# Patient Record
Sex: FEMALE | Race: WHITE | NOT HISPANIC OR LATINO | Employment: FULL TIME | URBAN - METROPOLITAN AREA
[De-identification: names, ages, dates, MRNs, and addresses within clinical notes are randomized per-mention and may not be internally consistent; named-entity substitution may affect disease eponyms.]

---

## 2018-09-26 ENCOUNTER — APPOINTMENT (OUTPATIENT)
Dept: WOMENS IMAGING | Facility: HOSPITAL | Age: 38
End: 2018-09-26

## 2018-09-26 PROCEDURE — 77067 SCR MAMMO BI INCL CAD: CPT | Performed by: RADIOLOGY

## 2022-05-31 RX ORDER — CHLORCYCLIZINE HYDROCHLORIDE AND PSEUDOEPHEDRINE HYDROCHLORIDE 25; 60 MG/1; MG/1
TABLET ORAL
Qty: 20 TABLET | Refills: 0 | Status: SHIPPED | OUTPATIENT
Start: 2022-05-31 | End: 2022-11-08

## 2022-11-08 ENCOUNTER — TELEMEDICINE (OUTPATIENT)
Dept: FAMILY MEDICINE CLINIC | Facility: CLINIC | Age: 42
End: 2022-11-08

## 2022-11-08 DIAGNOSIS — G43.C1 INTRACTABLE PERIODIC HEADACHE SYNDROME: Primary | ICD-10-CM

## 2022-11-08 PROCEDURE — 99213 OFFICE O/P EST LOW 20 MIN: CPT | Performed by: FAMILY MEDICINE

## 2022-11-08 PROCEDURE — 96372 THER/PROPH/DIAG INJ SC/IM: CPT | Performed by: FAMILY MEDICINE

## 2022-11-08 RX ORDER — CYCLOSPORINE 0.5 MG/ML
EMULSION OPHTHALMIC
COMMUNITY
Start: 2018-05-08

## 2022-11-08 RX ORDER — ONDANSETRON 8 MG/1
8 TABLET, ORALLY DISINTEGRATING ORAL EVERY 8 HOURS PRN
Qty: 30 TABLET | Refills: 1 | Status: SHIPPED | OUTPATIENT
Start: 2022-11-08 | End: 2023-02-27

## 2022-11-08 RX ORDER — TRIAMCINOLONE ACETONIDE 40 MG/ML
80 INJECTION, SUSPENSION INTRA-ARTICULAR; INTRAMUSCULAR ONCE
Status: COMPLETED | OUTPATIENT
Start: 2022-11-08 | End: 2022-11-08

## 2022-11-08 RX ORDER — KETOROLAC TROMETHAMINE 30 MG/ML
60 INJECTION, SOLUTION INTRAMUSCULAR; INTRAVENOUS ONCE
Status: COMPLETED | OUTPATIENT
Start: 2022-11-08 | End: 2022-11-08

## 2022-11-08 RX ADMIN — KETOROLAC TROMETHAMINE 60 MG: 30 INJECTION, SOLUTION INTRAMUSCULAR; INTRAVENOUS at 15:54

## 2022-11-08 RX ADMIN — TRIAMCINOLONE ACETONIDE 80 MG: 40 INJECTION, SUSPENSION INTRA-ARTICULAR; INTRAMUSCULAR at 15:55

## 2022-11-08 NOTE — ASSESSMENT & PLAN NOTE
Patient will come back for Kenalog and Toradol injections.  If this continues to be an issue we did discuss talking about a daily prophylactic medication.

## 2022-11-08 NOTE — PROGRESS NOTES
Patient was seen today through synchronous audio/video technology. Verbal consent was obtained. The patient was located at Her car. Dr. Oliva was located at [facility] in [place], [place]. Vitals signs were not obtained due to lack of home monitoring access.       Date: 2022   Patient Name: Lizzeth Tejada  : 1980   MRN: [id number]     Chief Complaint:    Chief Complaint   Patient presents with   • Headache     Complaining of nausea        History of Present Illness: Lizzeth Tejada is a 42 y.o. female who is here today for Headache.  She reports persistent headache over the last couple of weeks associated with nausea.  The headache will sometimes wake her at night and is present in the morning when she wakes up.  She cannot think of any specific triggers but she has episodes of these persistent intractable headaches over the last couple of years.  She is not currently on any prophylactic or abortive migraine medications.  She previously thought this may be related to sinus issues but has not had any other sinus symptoms.           Review of Systems:   Review of Systems   Constitutional: Negative for chills, fatigue and fever.   Respiratory: Negative for cough and shortness of breath.    Cardiovascular: Negative for chest pain and palpitations.   Gastrointestinal: Positive for nausea. Negative for abdominal pain, constipation, diarrhea and vomiting.   Musculoskeletal: Negative for back pain and myalgias.   Neurological: Positive for headache. Negative for dizziness.   Psychiatric/Behavioral: Negative for depressed mood. The patient is not nervous/anxious.        Past Medical History:   Past Medical History:   Diagnosis Date   • Anxiety    • Chronically dry eyes    • Headache    • Tonsillitis        Past Surgical History:   Past Surgical History:   Procedure Laterality Date   •  SECTION     • CHOLECYSTECTOMY     • TONSILLECTOMY         Family History:   Family History    Problem Relation Age of Onset   • No Known Problems Mother    • Hypertension Father    • Lung cancer Father        Social History:   Social History     Socioeconomic History   • Marital status: Unknown   Tobacco Use   • Smoking status: Never   • Smokeless tobacco: Never   Vaping Use   • Vaping Use: Never used   Substance and Sexual Activity   • Alcohol use: Yes     Comment: socially   • Drug use: Never   • Sexual activity: Not Currently     Partners: Male       Medications:     Current Outpatient Medications:   •  cycloSPORINE (Restasis) 0.05 % ophthalmic emulsion, , Disp: , Rfl:   •  ondansetron ODT (ZOFRAN-ODT) 8 MG disintegrating tablet, Place 1 tablet on the tongue Every 8 (Eight) Hours As Needed for Nausea or Vomiting., Disp: 30 tablet, Rfl: 1    Current Facility-Administered Medications:   •  ketorolac (TORADOL) injection 60 mg, 60 mg, Intramuscular, Once, Chelly Oliva DO  •  triamcinolone acetonide (KENALOG-40) injection 80 mg, 80 mg, Intramuscular, Once, Chelly Oliva DO    Allergies:   Allergies   Allergen Reactions   • Codeine Hives     Difficulty breathing          Physical Exam:  Vital Signs: There were no vitals filed for this visit.  There is no height or weight on file to calculate BMI.     Physical Exam  Vitals and nursing note reviewed.   Constitutional:       Appearance: Normal appearance.   HENT:      Head: Normocephalic.   Pulmonary:      Effort: Pulmonary effort is normal.   Neurological:      Mental Status: She is alert and oriented to person, place, and time.   Psychiatric:         Mood and Affect: Mood normal.         Behavior: Behavior normal.           Assessment/Plan:   Diagnoses and all orders for this visit:    1. Intractable periodic headache syndrome (Primary)  Assessment & Plan:  Patient will come back for Kenalog and Toradol injections.  If this continues to be an issue we did discuss talking about a daily prophylactic medication.      Orders:  -      ondansetron ODT (ZOFRAN-ODT) 8 MG disintegrating tablet; Place 1 tablet on the tongue Every 8 (Eight) Hours As Needed for Nausea or Vomiting.  Dispense: 30 tablet; Refill: 1  -     triamcinolone acetonide (KENALOG-40) injection 80 mg  -     ketorolac (TORADOL) injection 60 mg         Follow Up:   No follow-ups on file.    Chelly Oliva DO  [facility]

## 2022-12-15 ENCOUNTER — TELEMEDICINE (OUTPATIENT)
Dept: FAMILY MEDICINE CLINIC | Facility: CLINIC | Age: 42
End: 2022-12-15

## 2022-12-15 VITALS — BODY MASS INDEX: 36.57 KG/M2 | HEIGHT: 72 IN | WEIGHT: 270 LBS

## 2022-12-15 DIAGNOSIS — J01.00 ACUTE NON-RECURRENT MAXILLARY SINUSITIS: Primary | ICD-10-CM

## 2022-12-15 PROCEDURE — 99213 OFFICE O/P EST LOW 20 MIN: CPT | Performed by: PHYSICIAN ASSISTANT

## 2022-12-15 RX ORDER — IVERMECTIN 10 MG/G
CREAM TOPICAL
COMMUNITY
Start: 2022-11-22 | End: 2023-02-27

## 2022-12-15 RX ORDER — CHLORCYCLIZINE HYDROCHLORIDE AND PSEUDOEPHEDRINE HYDROCHLORIDE 25; 60 MG/1; MG/1
TABLET ORAL
Qty: 20 TABLET | OUTPATIENT
Start: 2022-12-15

## 2022-12-15 RX ORDER — DOXYCYCLINE 100 MG/1
100 CAPSULE ORAL 2 TIMES DAILY
Qty: 20 CAPSULE | Refills: 0 | Status: SHIPPED | OUTPATIENT
Start: 2022-12-15 | End: 2023-02-27

## 2022-12-15 RX ORDER — METHYLPREDNISOLONE 4 MG/1
TABLET ORAL
Qty: 21 TABLET | Refills: 0 | Status: SHIPPED | OUTPATIENT
Start: 2022-12-15 | End: 2023-02-27

## 2022-12-15 RX ORDER — CHLORCYCLIZINE HYDROCHLORIDE AND PSEUDOEPHEDRINE HYDROCHLORIDE 25; 60 MG/1; MG/1
1 TABLET ORAL 2 TIMES DAILY
Qty: 30 TABLET | Refills: 0 | Status: SHIPPED | OUTPATIENT
Start: 2022-12-15 | End: 2022-12-19

## 2022-12-15 NOTE — PROGRESS NOTES
"Chief Complaint  Sinusitis (X5 days. Patient has been vaccinated.)    Subjective  Patient presents during the COVID-19  pandemic/federally declared state of public health emergency.  For today's visit this provider is located at [facility]. Patient was seen today through synchronous audio/video technology using [employer]. Verbal consent was obtained. The patient was located at home. Vitals signs were not obtained due to lack of home monitoring access. Time spent with patient was 15 minutes.        Lizzeth Tejada presents to [facility]  History of Present Illness patient is being seen today for \"sinusitis.\"  She has classic symptoms that include facial pressure on the right side, right-sided teeth pain and ear fullness and pressure.  She has not had fever or chills no loss of taste or smell she does not feel fatigued and has no cough or lung congestion.  She has a history of sinus infections similar to this in the past.  She has not been exposed to COVID and did do a home COVID test which was negative.    Objective   Vital Signs:   Ht 182.9 cm (72\")   Wt 122 kg (270 lb) Comment: Pt reported vitals  BMI 36.62 kg/m²     Body mass index is 36.62 kg/m².    Review of Systems   Constitutional: Negative for chills, fatigue and fever.   HENT: Positive for congestion, ear pain, postnasal drip and sinus pressure. Negative for sore throat, swollen glands and trouble swallowing.    Respiratory: Negative for cough and shortness of breath.    Cardiovascular: Negative for chest pain and palpitations.   Musculoskeletal: Negative for back pain and myalgias.   Neurological: Negative for dizziness and headache.   Psychiatric/Behavioral: Negative for depressed mood. The patient is not nervous/anxious.        Past History:  Medical History: has a past medical history of Anxiety, Chronically dry eyes, Headache, and Tonsillitis.   Surgical History: has a past surgical history " that includes Tonsillectomy (); Cholecystectomy; and  section.   Family History: family history includes Hypertension in her father; Lung cancer in her father; No Known Problems in her mother.   Social History: reports that she has never smoked. She has never used smokeless tobacco. She reports current alcohol use. She reports that she does not use drugs.      Current Outpatient Medications:   •  cycloSPORINE (RESTASIS) 0.05 % ophthalmic emulsion, , Disp: , Rfl:   •  ondansetron ODT (ZOFRAN-ODT) 8 MG disintegrating tablet, Place 1 tablet on the tongue Every 8 (Eight) Hours As Needed for Nausea or Vomiting., Disp: 30 tablet, Rfl: 1  •  Soolantra 1 % cream, , Disp: , Rfl:   •  Chlorcyclizine-Pseudoephed (Stahist AD) 25-60 MG tablet, Take 1 tablet by mouth 2 (Two) Times a Day., Disp: 30 tablet, Rfl: 0  •  doxycycline (MONODOX) 100 MG capsule, Take 1 capsule by mouth 2 (Two) Times a Day., Disp: 20 capsule, Rfl: 0  •  methylPREDNISolone (MEDROL) 4 MG dose pack, Take as directed on package instructions., Disp: 21 tablet, Rfl: 0    Allergies: Codeine    Physical Exam  Constitutional:       Appearance: Normal appearance.   Neurological:      Mental Status: She is alert and oriented to person, place, and time.   Psychiatric:         Mood and Affect: Mood normal.         Behavior: Behavior normal.          Result Review :                   Assessment and Plan    Diagnoses and all orders for this visit:    1. Acute non-recurrent maxillary sinusitis (Primary)  Assessment & Plan:  Rx for doxycycline, Stahist AD and Medrol Dosepak.  Patient was advised to call or return if symptoms persist or worsen.      Other orders  -     doxycycline (MONODOX) 100 MG capsule; Take 1 capsule by mouth 2 (Two) Times a Day.  Dispense: 20 capsule; Refill: 0  -     methylPREDNISolone (MEDROL) 4 MG dose pack; Take as directed on package instructions.  Dispense: 21 tablet; Refill: 0  -     Chlorcyclizine-Pseudoephed (Stahist AD) 25-60 MG  tablet; Take 1 tablet by mouth 2 (Two) Times a Day.  Dispense: 30 tablet; Refill: 0      Follow Up   No follow-ups on file.  Patient was given instructions and counseling regarding her condition or for health maintenance advice. Please see specific information pulled into the AVS if appropriate.     Shavon Mitchell PA-C

## 2022-12-15 NOTE — ASSESSMENT & PLAN NOTE
Rx for doxycycline, Stahist AD and Medrol Dosepak.  Patient was advised to call or return if symptoms persist or worsen.

## 2022-12-16 ENCOUNTER — TELEPHONE (OUTPATIENT)
Dept: FAMILY MEDICINE CLINIC | Facility: CLINIC | Age: 42
End: 2022-12-16

## 2022-12-16 NOTE — TELEPHONE ENCOUNTER
Caller: Lizzeth Tejada     Relationship: SELF    Best call back number: [phone] TO LEAVE DETAILED MESSAGE IF NO ANSWER    What is your medical concern? WAS SEEN VIA TELEHEALTH 12/15/22, NEW SYMPTOMS: A LOT OF TIGHTNESS IN CHEST WHEN BREATHING, SORE THROAT, IN ADDITION TO THE OTHER SYMPTOMS FROM YESTERDAY.   STAY-HIST IS ON BACK ORDER AND PHARMACY HAS NOT GOTTEN ANY IN A MONTH. DID GET DOXY-CYLCINE AND MEDROL STEROID PACK    How long has this issue been going on? ADDITIONAL SYMPTOMS BEGAN LAST NIGHT. CAN FEEL IT WHEN SHE BREATHS    Is your provider already aware of this issue? NOT ABOUT THE ADDITIONAL SYMPTOMS AND THAT THE STAY-HIST WAS NOT FILLED DUE TO BACK ORDER    PLEASE CALL WITH ADVICE OR CALL IN NEW MEDICATION TO: [facility] [id number] [facility][1] [address] - [phone] PH - [phone] FX      BOTH MOTHER AND SON DIAGNOSED WITH STREPP. ASKING IF THE CURRENT MEDICATIONS WILL COVER THE STREPP AND IF AN ALTERNATIVE FOR STAY-HIST CAN BE CALLED IN.

## 2022-12-19 RX ORDER — LORATADINE AND PSEUDOEPHEDRINE SULFATE 5; 120 MG/1; MG/1
1 TABLET, EXTENDED RELEASE ORAL 2 TIMES DAILY
Qty: 20 TABLET | Refills: 0 | Status: SHIPPED | OUTPATIENT
Start: 2022-12-19 | End: 2023-02-27

## 2022-12-19 NOTE — TELEPHONE ENCOUNTER
Patient states that she still has eye pain, ear pain, and teeth pain from the sinuses. She said that she is not experiencing chest pain but just a lot of congestion and a lot of coughing up a lot of mucus. Spoke with  and she will speak with provider and contact the patient.

## 2022-12-19 NOTE — TELEPHONE ENCOUNTER
Patient can take Claritin D instead of the Stahist and I will send in a script for that. The antibiotics should be helping the infection.   The steroids will reduce inflammation.

## 2023-01-10 ENCOUNTER — TELEPHONE (OUTPATIENT)
Dept: FAMILY MEDICINE CLINIC | Facility: CLINIC | Age: 43
End: 2023-01-10

## 2023-01-10 NOTE — TELEPHONE ENCOUNTER
Caller: Lizzeth Tejada    Relationship: Self    Best call back number: [phone]  What medication are you requesting: DIFLUCAN     What are your current symptoms: YEAST INFECTION AFTER 2 ROUNDS OF ANTIBIOTICS AND STEROIDS     How long have you been experiencing symptoms:  10 DAYS    Have you had these symptoms before:    [x] Yes  [] No    Have you been treated for these symptoms before:   [x] Yes  [] No    If a prescription is needed, what is your preferred pharmacy and phone number: [facility] [id number] [place] [id number] [facility] [address] - [phone] [facility] [phone] FX     Additional notes:  TRIED OVER THE COUNTER MEDS BUT NOT HELPING. WOULD LIKE SOMETHING CALLED IN IF POSSIBLE.

## 2023-01-11 ENCOUNTER — TELEPHONE (OUTPATIENT)
Dept: FAMILY MEDICINE CLINIC | Facility: CLINIC | Age: 43
End: 2023-01-11
Payer: COMMERCIAL

## 2023-01-11 RX ORDER — FLUCONAZOLE 150 MG/1
150 TABLET ORAL ONCE
COMMUNITY
End: 2023-01-11 | Stop reason: SDUPTHER

## 2023-01-11 RX ORDER — FLUCONAZOLE 150 MG/1
TABLET ORAL
Qty: 2 TABLET | Refills: 1 | Status: SHIPPED | OUTPATIENT
Start: 2023-01-11 | End: 2023-02-27

## 2023-01-11 NOTE — TELEPHONE ENCOUNTER
JORDANA PATIENT   yeast infection after 2 rounds of antibiotics and steroids. Patient has tried OTC meds. Onset for past 10 days. Pt is leaving to go out of town this evening and said she is totally miserable.

## 2023-02-27 ENCOUNTER — OFFICE VISIT (OUTPATIENT)
Dept: FAMILY MEDICINE CLINIC | Facility: CLINIC | Age: 43
End: 2023-02-27
Payer: COMMERCIAL

## 2023-02-27 VITALS
WEIGHT: 287.1 LBS | HEART RATE: 64 BPM | DIASTOLIC BLOOD PRESSURE: 86 MMHG | OXYGEN SATURATION: 97 % | HEIGHT: 72 IN | SYSTOLIC BLOOD PRESSURE: 112 MMHG | TEMPERATURE: 97.5 F | BODY MASS INDEX: 38.89 KG/M2

## 2023-02-27 DIAGNOSIS — N92.6 IRREGULAR MENSES: ICD-10-CM

## 2023-02-27 DIAGNOSIS — E66.09 CLASS 2 OBESITY DUE TO EXCESS CALORIES WITHOUT SERIOUS COMORBIDITY WITH BODY MASS INDEX (BMI) OF 38.0 TO 38.9 IN ADULT: ICD-10-CM

## 2023-02-27 DIAGNOSIS — Z00.00 ENCOUNTER FOR WELLNESS EXAMINATION IN ADULT: Primary | ICD-10-CM

## 2023-02-27 PROBLEM — E66.812 CLASS 2 OBESITY DUE TO EXCESS CALORIES WITHOUT SERIOUS COMORBIDITY WITH BODY MASS INDEX (BMI) OF 38.0 TO 38.9 IN ADULT: Status: ACTIVE | Noted: 2023-02-27

## 2023-02-27 PROCEDURE — 99396 PREV VISIT EST AGE 40-64: CPT | Performed by: FAMILY MEDICINE

## 2023-02-27 NOTE — ASSESSMENT & PLAN NOTE
Unclear if this is from premenopause or from having COVID.  Will obtain additional hormonal labs today.

## 2023-02-27 NOTE — PROGRESS NOTES
Patient Name: Lizzeth Tejada  : 1980   MRN: [id number]     Chief Complaint:    Chief Complaint   Patient presents with   • Annual Exam   • Tinnitus       History of Present Illness: Lizzeth Tejada is a 42 y.o. female who is here today for their annual health maintenance and physical. Patient presents for follow-up on chronic medical problems including chronic dry eyes. Patient denies adverse effects of medications, headache, dizziness, chest pain, palpitations, shortness of breath and cough. Patient complains of tinnitus, insomnia, and unexpected weight gain. Patient is here for monitoring of chronic issues and fasting lab work.  She is up-to-date on mammogram and Pap smear.  She denies family history of colon, breast or ovarian cancer.    She has been having issues with insomnia, some fatigue, and unexpected weight gain since December.  It was around this time that she had COVID and flu.  She has noticed irregular menses since that time as well and is wondering if she is starting to go through menopause.  She reports her mom went through menopause in her mid 50s.  She denies known history of SHAN but has never had a sleep study.             Review of Systems:   Review of Systems   Constitutional: Positive for fatigue and unexpected weight gain. Negative for chills and fever.   Respiratory: Negative for cough and shortness of breath.    Cardiovascular: Negative for chest pain and palpitations.   Gastrointestinal: Negative for abdominal pain, constipation, diarrhea, nausea and vomiting.   Genitourinary: Positive for menstrual problem.   Musculoskeletal: Negative for back pain and myalgias.   Neurological: Negative for dizziness and headache.   Psychiatric/Behavioral: Positive for sleep disturbance. Negative for depressed mood. The patient is not nervous/anxious.        Past Medical History, Social History, Family History and Care Team were all reviewed with patient and updated as appropriate.  "    Medications:     Current Outpatient Medications:   •  cycloSPORINE (RESTASIS) 0.05 % ophthalmic emulsion, , Disp: , Rfl:     Allergies:   Allergies   Allergen Reactions   • Codeine Hives     Difficulty breathing          Depression: PHQ-2 Depression Screening  PHQ-2 Total Score:     PHQ-9 Total Score:       Intimate partner violence: (Screen on initial visit, pregnant women, women with injuries, older adult with injury or evidence of neglect):  • Violence can be a problem in many people's lives, so I now ask every patient about trauma or abuse they may have experienced in a relationship.  • Stress/Safety - Do you feel safe in your relationship?  • Afraid/Abused - Have you ever been in a relationship where you were threatened, hurt, or afraid?  • Friend/Family - Are your friends aware you have been hurt?  • Emergency Plan - Do you have a safe place to go and the resources you need in an emergency?    Osteoporosis:   • Ost menopausal women < 65 with RF (advancing age, previous fracture, glucocorticoid therapy, parental hip fracture, low body weight, current cigarette smoking, excessive alcohol consumption, rheumatoid arthritis, secondary osteoporosis [hypogonadism/premature menopause, malabsorption, chronic liver disease, IBD]).  • All women 65 or older      Physical Exam:  Vital Signs:   Vitals:    02/27/23 1303   BP: 112/86   BP Location: Left arm   Patient Position: Sitting   Cuff Size: Large Adult   Pulse: 64   Temp: 97.5 °F (36.4 °C)   TempSrc: Temporal   SpO2: 97%   Weight: 130 kg (287 lb 1.6 oz)   Height: 182.9 cm (72\")     Body mass index is 38.94 kg/m².     Physical Exam  Vitals and nursing note reviewed.   Constitutional:       Appearance: Normal appearance. She is obese.   HENT:      Head: Normocephalic and atraumatic.      Right Ear: Ear canal normal. A middle ear effusion is present.      Left Ear: Ear canal normal. A middle ear effusion is present.      Nose: Nose normal.      Mouth/Throat:      " Mouth: Mucous membranes are moist.      Pharynx: Oropharynx is clear.   Eyes:      Conjunctiva/sclera: Conjunctivae normal.      Pupils: Pupils are equal, round, and reactive to light.   Cardiovascular:      Rate and Rhythm: Normal rate and regular rhythm.      Heart sounds: Normal heart sounds. No murmur heard.  Pulmonary:      Effort: Pulmonary effort is normal.      Breath sounds: Normal breath sounds. No wheezing, rhonchi or rales.   Abdominal:      General: Bowel sounds are normal.      Palpations: Abdomen is soft.      Tenderness: There is no abdominal tenderness.   Musculoskeletal:         General: Normal range of motion.      Cervical back: Normal range of motion and neck supple.      Right lower leg: No edema.      Left lower leg: No edema.   Lymphadenopathy:      Cervical: No cervical adenopathy.   Skin:     General: Skin is warm.      Findings: No rash.   Neurological:      General: No focal deficit present.      Mental Status: She is alert and oriented to person, place, and time.      Motor: No weakness.   Psychiatric:         Mood and Affect: Mood normal.         Behavior: Behavior normal.         Procedures      Assessment/Plan:   Diagnoses and all orders for this visit:    1. Encounter for wellness examination in adult (Primary)  Assessment & Plan:  Fasting labs today, up-to-date on health maintenance    Orders:  -     Hemoglobin A1c; Future  -     CBC Auto Differential; Future  -     Comprehensive Metabolic Panel; Future  -     Lipid Panel; Future  -     TSH; Future  -     T4, Free; Future    2. Irregular menses  Assessment & Plan:  Unclear if this is from premenopause or from having COVID.  Will obtain additional hormonal labs today.    Orders:  -     Estrogens, Total; Future  -     Progesterone; Future    3. Class 2 obesity due to excess calories without serious comorbidity with body mass index (BMI) of 38.0 to 38.9 in adult  Assessment & Plan:  Patient's (Body mass index is 38.94 kg/m².) indicates  that they are obese (BMI >30) with health conditions that include none . Weight is worsening. BMI  is above average; BMI management plan is completed. We discussed portion control and increasing exercise.            Follow Up:   Return in about 1 year (around 2/27/2024) for Annual physical.    Healthcare Maintenance:   Counseling provided on Discussed injury prevention, diet and exercise, safe sexual practices, and screening for common diseases. Encouraged use of sunscreen and seatbelts. Encouraged SBE, avoidance of tobacco, limiting alcohol, and yearly dental and eye exams.   .   Lizzeth Tejada voices understanding and acceptance of this advice and will call back with any further questions or concerns. AVS with preventive healthcare tips printed for patient.     Chelly Oliva, DO  [facility]

## 2023-02-28 LAB
ALBUMIN SERPL-MCNC: 4.3 G/DL (ref 3.8–4.8)
ALBUMIN/GLOB SERPL: 1.5 {RATIO} (ref 1.2–2.2)
ALP SERPL-CCNC: 56 IU/L (ref 44–121)
ALT SERPL-CCNC: 17 IU/L (ref 0–32)
AST SERPL-CCNC: 18 IU/L (ref 0–40)
BASOPHILS # BLD AUTO: 0.1 X10E3/UL (ref 0–0.2)
BASOPHILS NFR BLD AUTO: 1 %
BILIRUB SERPL-MCNC: 0.4 MG/DL (ref 0–1.2)
BUN SERPL-MCNC: 10 MG/DL (ref 6–24)
BUN/CREAT SERPL: 12 (ref 9–23)
CALCIUM SERPL-MCNC: 9.2 MG/DL (ref 8.7–10.2)
CHLORIDE SERPL-SCNC: 106 MMOL/L (ref 96–106)
CHOLEST SERPL-MCNC: 149 MG/DL (ref 100–199)
CO2 SERPL-SCNC: 23 MMOL/L (ref 20–29)
CREAT SERPL-MCNC: 0.81 MG/DL (ref 0.57–1)
EGFRCR SERPLBLD CKD-EPI 2021: 92 ML/MIN/1.73
EOSINOPHIL # BLD AUTO: 0.1 X10E3/UL (ref 0–0.4)
EOSINOPHIL NFR BLD AUTO: 1 %
ERYTHROCYTE [DISTWIDTH] IN BLOOD BY AUTOMATED COUNT: 12.1 % (ref 11.7–15.4)
GLOBULIN SER CALC-MCNC: 2.8 G/DL (ref 1.5–4.5)
GLUCOSE SERPL-MCNC: 87 MG/DL (ref 70–99)
HBA1C MFR BLD: 5.3 % (ref 4.8–5.6)
HCT VFR BLD AUTO: 36.9 % (ref 34–46.6)
HDLC SERPL-MCNC: 66 MG/DL
HGB BLD-MCNC: 13.1 G/DL (ref 11.1–15.9)
IMM GRANULOCYTES # BLD AUTO: 0 X10E3/UL (ref 0–0.1)
IMM GRANULOCYTES NFR BLD AUTO: 0 %
LDLC SERPL CALC-MCNC: 69 MG/DL (ref 0–99)
LYMPHOCYTES # BLD AUTO: 2.2 X10E3/UL (ref 0.7–3.1)
LYMPHOCYTES NFR BLD AUTO: 28 %
MCH RBC QN AUTO: 34.6 PG (ref 26.6–33)
MCHC RBC AUTO-ENTMCNC: 35.5 G/DL (ref 31.5–35.7)
MCV RBC AUTO: 97 FL (ref 79–97)
MONOCYTES # BLD AUTO: 0.6 X10E3/UL (ref 0.1–0.9)
MONOCYTES NFR BLD AUTO: 8 %
NEUTROPHILS # BLD AUTO: 5 X10E3/UL (ref 1.4–7)
NEUTROPHILS NFR BLD AUTO: 62 %
PLATELET # BLD AUTO: 266 X10E3/UL (ref 150–450)
POTASSIUM SERPL-SCNC: 4.7 MMOL/L (ref 3.5–5.2)
PROGEST SERPL-MCNC: 2.6 NG/ML
PROT SERPL-MCNC: 7.1 G/DL (ref 6–8.5)
RBC # BLD AUTO: 3.79 X10E6/UL (ref 3.77–5.28)
SODIUM SERPL-SCNC: 140 MMOL/L (ref 134–144)
T4 FREE SERPL-MCNC: 1.06 NG/DL (ref 0.82–1.77)
TRIGL SERPL-MCNC: 70 MG/DL (ref 0–149)
TSH SERPL DL<=0.005 MIU/L-ACNC: 0.56 UIU/ML (ref 0.45–4.5)
VLDLC SERPL CALC-MCNC: 14 MG/DL (ref 5–40)
WBC # BLD AUTO: 8 X10E3/UL (ref 3.4–10.8)

## 2023-03-05 LAB — ESTROGEN SERPL-MCNC: 152 PG/ML

## 2023-11-28 ENCOUNTER — TELEMEDICINE (OUTPATIENT)
Dept: FAMILY MEDICINE CLINIC | Facility: TELEHEALTH | Age: 43
End: 2023-11-28
Payer: COMMERCIAL

## 2023-11-28 VITALS — HEIGHT: 72 IN | WEIGHT: 287 LBS | BODY MASS INDEX: 38.87 KG/M2

## 2023-11-28 DIAGNOSIS — H66.91 RIGHT OTITIS MEDIA, UNSPECIFIED OTITIS MEDIA TYPE: Primary | ICD-10-CM

## 2023-11-28 DIAGNOSIS — J01.00 ACUTE MAXILLARY SINUSITIS, RECURRENCE NOT SPECIFIED: ICD-10-CM

## 2023-11-28 RX ORDER — PREDNISONE 20 MG/1
20 TABLET ORAL 2 TIMES DAILY
Qty: 6 TABLET | Refills: 0 | Status: SHIPPED | OUTPATIENT
Start: 2023-11-28 | End: 2023-12-01

## 2023-11-28 RX ORDER — OFLOXACIN OTIC SOLUTION 3 MG/ML
10 SOLUTION/ DROPS AURICULAR (OTIC) DAILY
Qty: 5 ML | Refills: 0 | Status: SHIPPED | OUTPATIENT
Start: 2023-11-28 | End: 2023-12-05

## 2023-11-28 RX ORDER — GUAIFENESIN 600 MG/1
600 TABLET, EXTENDED RELEASE ORAL 2 TIMES DAILY
Qty: 28 TABLET | Refills: 0 | Status: SHIPPED | OUTPATIENT
Start: 2023-11-28 | End: 2023-12-12

## 2023-11-28 NOTE — PATIENT INSTRUCTIONS
Otitis Media, Adult    Otitis media occurs when there is inflammation and fluid in the middle ear with signs and symptoms of an acute infection. The middle ear is a part of the ear that contains bones for hearing as well as air that helps send sounds to the brain. When infected fluid builds up in this space, it causes pressure and can lead to an ear infection. The eustachian tube connects the middle ear to the back of the nose (nasopharynx) and normally allows air into the middle ear. If the eustachian tube becomes blocked, fluid can build up and become infected.  What are the causes?  This condition is caused by a blockage in the eustachian tube. This can be caused by mucus or by swelling of the tube. Problems that can cause a blockage include:  A cold or other upper respiratory infection.  Allergies.  An irritant, such as tobacco smoke.  Enlarged adenoids. The adenoids are areas of soft tissue located high in the back of the throat, behind the nose and the roof of the mouth. They are part of the body's defense system (immune system).  A mass in the nasopharynx.  Damage to the ear caused by pressure changes (barotrauma).  What increases the risk?  You are more likely to develop this condition if you:  Smoke or are exposed to tobacco smoke.  Have an opening in the roof of your mouth (cleft palate).  Have gastroesophageal reflux.  Have an immune system disorder.  What are the signs or symptoms?  Symptoms of this condition include:  Ear pain.  Fever.  Decreased hearing.  Tiredness (lethargy).  Fluid leaking from the ear, if the eardrum is ruptured or has burst.  Ringing in the ear.  How is this diagnosed?    This condition is diagnosed with a physical exam. During the exam, your health care provider will use an instrument called an otoscope to look in your ear and check for redness, swelling, and fluid. He or she will also ask about your symptoms.  Your health care provider may also order tests, such as:  A pneumatic  otoscopy. This is a test to check the movement of the eardrum. It is done by squeezing a small amount of air into the ear.  A tympanogram. This is a test that shows how well the eardrum moves in response to air pressure in the ear canal. It provides a graph for your health care provider to review.  How is this treated?  This condition can go away on its own within 3-5 days. But if the condition is caused by a bacterial infection and does not go away on its own, or if it keeps coming back, your health care provider may:  Prescribe antibiotic medicine to treat the infection.  Prescribe or recommend medicines to control pain.  Follow these instructions at home:  Take over-the-counter and prescription medicines only as told by your health care provider.  If you were prescribed an antibiotic medicine, take it as told by your health care provider. Do not stop taking the antibiotic even if you start to feel better.  Keep all follow-up visits. This is important.  Contact a health care provider if:  You have bleeding from your nose.  There is a lump on your neck.  You are not feeling better in 5 days.  You feel worse instead of better.  Get help right away if:  You have severe pain that is not controlled with medicine.  You have swelling, redness, or pain around your ear.  You have stiffness in your neck.  A part of your face is not moving (paralyzed).  The bone behind your ear (mastoid bone) is tender when you touch it.  You develop a severe headache.  Summary  Otitis media is redness, soreness, and swelling of the middle ear, usually resulting in pain and decreased hearing.  This condition can go away on its own within 3-5 days.  If the problem does not go away in 3-5 days, your health care provider may give you medicines to treat the infection.  If you were prescribed an antibiotic medicine, take it as told by your health care provider.  Follow all instructions that were given to you by your health care provider.  This  information is not intended to replace advice given to you by your health care provider. Make sure you discuss any questions you have with your health care provider.  Document Revised: 03/28/2022 Document Reviewed: 03/28/2022  Elsevier Patient Education © 2023 [employer]  Sinus Infection, Adult  A sinus infection, also called sinusitis, is inflammation of your sinuses. Sinuses are hollow spaces in the bones around your face. Your sinuses are located:  Around your eyes.  In the middle of your forehead.  Behind your nose.  In your cheekbones.  Mucus normally drains out of your sinuses. When your nasal tissues become inflamed or swollen, mucus can become trapped or blocked. This allows bacteria, viruses, and fungi to grow, which leads to infection. Most infections of the sinuses are caused by a virus.  A sinus infection can develop quickly. It can last for up to 4 weeks (acute) or for more than 12 weeks (chronic). A sinus infection often develops after a cold.  What are the causes?  This condition is caused by anything that creates swelling in the sinuses or stops mucus from draining. This includes:  Allergies.  Asthma.  Infection from bacteria or viruses.  Deformities or blockages in your nose or sinuses.  Abnormal growths in the nose (nasal polyps).  Pollutants, such as chemicals or irritants in the air.  Infection from fungi. This is rare.  What increases the risk?  You are more likely to develop this condition if you:  Have a weak body defense system (immune system).  Do a lot of swimming or diving.  Overuse nasal sprays.  Smoke.  What are the signs or symptoms?  The main symptoms of this condition are pain and a feeling of pressure around the affected sinuses. Other symptoms include:  Stuffy nose or congestion that makes it difficult to breathe through your nose.  Thick yellow or greenish drainage from your nose.  Tenderness, swelling, and warmth over the affected sinuses.  A cough that may get worse at  night.  Decreased sense of smell and taste.  Extra mucus that collects in the throat or the back of the nose (postnasal drip) causing a sore throat or bad breath.  Tiredness (fatigue).  Fever.  How is this diagnosed?  This condition is diagnosed based on:  Your symptoms.  Your medical history.  A physical exam.  Tests to find out if your condition is acute or chronic. This may include:  Checking your nose for nasal polyps.  Viewing your sinuses using a device that has a light (endoscope).  Testing for allergies or bacteria.  Imaging tests, such as an MRI or CT scan.  In rare cases, a bone biopsy may be done to rule out more serious types of fungal sinus disease.  How is this treated?  Treatment for a sinus infection depends on the cause and whether your condition is chronic or acute.  If caused by a virus, your symptoms should go away on their own within 10 days. You may be given medicines to relieve symptoms. They include:  Medicines that shrink swollen nasal passages (decongestants).  A spray that eases inflammation of the nostrils (topical intranasal corticosteroids).  Rinses that help get rid of thick mucus in your nose (nasal saline washes).  Medicines that treat allergies (antihistamines).  Over-the-counter pain relievers.  If caused by bacteria, your health care provider may recommend waiting to see if your symptoms improve. Most bacterial infections will get better without antibiotic medicine. You may be given antibiotics if you have:  A severe infection.  A weak immune system.  If caused by narrow nasal passages or nasal polyps, surgery may be needed.  Follow these instructions at home:  Medicines  Take, use, or apply over-the-counter and prescription medicines only as told by your health care provider. These may include nasal sprays.  If you were prescribed an antibiotic medicine, take it as told by your health care provider. Do not stop taking the antibiotic even if you start to feel better.  Hydrate and  humidify    Drink enough fluid to keep your urine pale yellow. Staying hydrated will help to thin your mucus.  Use a cool mist humidifier to keep the humidity level in your home above 50%.  Inhale steam for 10-15 minutes, 3-4 times a day, or as told by your health care provider. You can do this in the bathroom while a hot shower is running.  Limit your exposure to cool or dry air.  Rest  Rest as much as possible.  Sleep with your head raised (elevated).  Make sure you get enough sleep each night.  General instructions    Apply a warm, moist washcloth to your face 3-4 times a day or as told by your health care provider. This will help with discomfort.  Use nasal saline washes as often as told by your health care provider.  Wash your hands often with soap and water to reduce your exposure to germs. If soap and water are not available, use hand .  Do not smoke. Avoid being around people who are smoking (secondhand smoke).  Keep all follow-up visits. This is important.  Contact a health care provider if:  You have a fever.  Your symptoms get worse.  Your symptoms do not improve within 10 days.  Get help right away if:  You have a severe headache.  You have persistent vomiting.  You have severe pain or swelling around your face or eyes.  You have vision problems.  You develop confusion.  Your neck is stiff.  You have trouble breathing.  These symptoms may be an emergency. Get help right away. Call 911.  Do not wait to see if the symptoms will go away.  Do not drive yourself to the hospital.  Summary  A sinus infection is soreness and inflammation of your sinuses. Sinuses are hollow spaces in the bones around your face.  This condition is caused by nasal tissues that become inflamed or swollen. The swelling traps or blocks the flow of mucus. This allows bacteria, viruses, and fungi to grow, which leads to infection.  If you were prescribed an antibiotic medicine, take it as told by your health care provider. Do  not stop taking the antibiotic even if you start to feel better.  Keep all follow-up visits. This is important.  This information is not intended to replace advice given to you by your health care provider. Make sure you discuss any questions you have with your health care provider.  Document Revised: 11/22/2022 Document Reviewed: 11/22/2022  Elsevier Patient Education © 2023 [employer] Inc[employer]

## 2023-11-28 NOTE — PROGRESS NOTES
"You have chosen to receive care through a telehealth visit.  Do you consent to use a video/audio connection for your medical care today? Yes     HPI  Lizzeth Tejada is a 43 y.o. female  presents with complaint of sore throat, ear pain, headache. She reports right ear pain and right maxillary sinus pain and pressure. Her nasal congestion is clear. Additional symptoms that she is having are noted in the ROS portion of this visit. She is having a colonoscopy next Friday and was told to stop everything on Monday. Her right ear is bothering her the most. She did have a COVID test and the result of that test was negative.    Review of Systems   Constitutional:  Positive for fatigue (mild). Negative for chills and fever.   HENT:  Positive for congestion (clear), ear pain, postnasal drip, rhinorrhea (mild), sinus pressure (right maxillary), sinus pain (right maxillary), sneezing (mild) and sore throat.    Respiratory:  Positive for cough (mild). Negative for chest tightness, shortness of breath and wheezing.    Cardiovascular:  Chest pain: right.   Gastrointestinal:  Negative for diarrhea and nausea.   Musculoskeletal:  Positive for myalgias (mild).   Neurological:  Positive for headaches.       Past Medical History:   Diagnosis Date    Anxiety     Chronically dry eyes     Headache     Tonsillitis        Family History   Problem Relation Age of Onset    No Known Problems Mother     Hypertension Father     Lung cancer Father        Social History     Socioeconomic History    Marital status: Unknown   Tobacco Use    Smoking status: Never    Smokeless tobacco: Never   Vaping Use    Vaping Use: Never used   Substance and Sexual Activity    Alcohol use: Yes     Comment: socially    Drug use: Never    Sexual activity: Not Currently     Partners: Male       Lizzeth Tejada  reports that she has never smoked. She has never used smokeless tobacco..patient.          Ht 182.9 cm (72\")   Wt 130 kg (287 lb)   Breastfeeding No   " BMI 38.92 kg/m²     PHYSICAL EXAM  Physical Exam   Constitutional: She is oriented to person, place, and time. She appears well-developed.   HENT:   Head: Normocephalic and atraumatic.   Right Ear: There is tenderness.   Nose: Nose normal. Right sinus exhibits maxillary sinus tenderness (patient directed exam).   Eyes: Lids are normal. Right eye exhibits no discharge. Left eye exhibits no discharge. Right conjunctiva is not injected. Left conjunctiva is not injected.   Pulmonary/Chest:  No respiratory distress.  Neurological: She is alert and oriented to person, place, and time. No cranial nerve deficit.   Psychiatric: She has a normal mood and affect. Her speech is normal and behavior is normal. Judgment and thought content normal.       Results for orders placed or performed in visit on 02/27/23   Hemoglobin A1c    Specimen: Arm, Left; Blood   Result Value Ref Range    Hemoglobin A1C 5.3 4.8 - 5.6 %   CBC Auto Differential    Specimen: Arm, Left; Blood   Result Value Ref Range    WBC 8.0 3.4 - 10.8 x10E3/uL    RBC 3.79 3.77 - 5.28 x10E6/uL    Hemoglobin 13.1 11.1 - 15.9 g/dL    Hematocrit 36.9 34.0 - 46.6 %    MCV 97 79 - 97 fL    MCH 34.6 (H) 26.6 - 33.0 pg    MCHC 35.5 31.5 - 35.7 g/dL    RDW 12.1 11.7 - 15.4 %    Platelets 266 150 - 450 x10E3/uL    Neutrophil Rel % 62 Not Estab. %    Lymphocyte Rel % 28 Not Estab. %    Monocyte Rel % 8 Not Estab. %    Eosinophil Rel % 1 Not Estab. %    Basophil Rel % 1 Not Estab. %    Neutrophils Absolute 5.0 1.4 - 7.0 x10E3/uL    Lymphocytes Absolute 2.2 0.7 - 3.1 x10E3/uL    Monocytes Absolute 0.6 0.1 - 0.9 x10E3/uL    Eosinophils Absolute 0.1 0.0 - 0.4 x10E3/uL    Basophils Absolute 0.1 0.0 - 0.2 x10E3/uL    Immature Granulocyte Rel % 0 Not Estab. %    Immature Grans Absolute 0.0 0.0 - 0.1 x10E3/uL   Comprehensive Metabolic Panel    Specimen: Arm, Left; Blood   Result Value Ref Range    Glucose 87 70 - 99 mg/dL    BUN 10 6 - 24 mg/dL    Creatinine 0.81 0.57 - 1.00 mg/dL     EGFR Result 92 >59 mL/min/1.73    BUN/Creatinine Ratio 12 9 - 23    Sodium 140 134 - 144 mmol/L    Potassium 4.7 3.5 - 5.2 mmol/L    Chloride 106 96 - 106 mmol/L    Total CO2 23 20 - 29 mmol/L    Calcium 9.2 8.7 - 10.2 mg/dL    Total Protein 7.1 6.0 - 8.5 g/dL    Albumin 4.3 3.8 - 4.8 g/dL    Globulin 2.8 1.5 - 4.5 g/dL    A/G Ratio 1.5 1.2 - 2.2    Total Bilirubin 0.4 0.0 - 1.2 mg/dL    Alkaline Phosphatase 56 44 - 121 IU/L    AST (SGOT) 18 0 - 40 IU/L    ALT (SGPT) 17 0 - 32 IU/L   Lipid Panel    Specimen: Arm, Left; Blood   Result Value Ref Range    Total Cholesterol 149 100 - 199 mg/dL    Triglycerides 70 0 - 149 mg/dL    HDL Cholesterol 66 >39 mg/dL    VLDL Cholesterol Ganesh 14 5 - 40 mg/dL    LDL Chol Calc (NIH) 69 0 - 99 mg/dL   TSH    Specimen: Arm, Left; Blood   Result Value Ref Range    TSH 0.555 0.450 - 4.500 uIU/mL   T4, Free    Specimen: Arm, Left; Blood   Result Value Ref Range    Free T4 1.06 0.82 - 1.77 ng/dL   Estrogens, Total    Specimen: Arm, Left; Blood   Result Value Ref Range    Estrogen 152 pg/mL   Progesterone    Specimen: Arm, Left; Blood   Result Value Ref Range    Progesterone 2.6 ng/mL       Diagnoses and all orders for this visit:    1. Right otitis media, unspecified otitis media type (Primary)    2. Acute maxillary sinusitis, recurrence not specified    Other orders  -     guaiFENesin (Mucinex) 600 MG 12 hr tablet; Take 1 tablet by mouth 2 (Two) Times a Day for 14 days.  Dispense: 28 tablet; Refill: 0  -     predniSONE (DELTASONE) 20 MG tablet; Take 1 tablet by mouth 2 (Two) Times a Day for 3 days.  Dispense: 6 tablet; Refill: 0  -     Floxin Otic 0.3 % otic solution; Administer 10 drops to the right ear Daily for 7 days.  Dispense: 5 mL; Refill: 0    Mucinex with plenty of fluids especially water to thin secretions and help with congestion.  Take prednisone with food and not too close to bedtime as it may keep you awake at night  Do not take prednisone with nsaids such as ibuprofen,  aleve, or aspirin  May take tylenol for pain or fever  Antibiotic ear drop as directed    FOLLOW-UP  If symptoms worsen or persist follow up with PCP, Virtual Care or Urgent Care    Patient verbalizes understanding of medication dosage, comfort measures, instructions for treatment and follow-up.    Janet Spicer, APRN  11/28/2023  13:42 EST    The use of a video visit has been reviewed with the patient and verbal informed consent has been obtained. Myself and Lizzeth Tejada participated in this visit. The patient is located in [address] [place] [address].    I am located in [place], [place]. [employer] and [employer] were utilized. I spent 25 minutes in the patient's chart for this visit.

## 2023-12-06 ENCOUNTER — OFFICE VISIT (OUTPATIENT)
Dept: FAMILY MEDICINE CLINIC | Facility: CLINIC | Age: 43
End: 2023-12-06
Payer: COMMERCIAL

## 2023-12-06 VITALS
BODY MASS INDEX: 39.68 KG/M2 | DIASTOLIC BLOOD PRESSURE: 82 MMHG | HEART RATE: 73 BPM | WEIGHT: 293 LBS | HEIGHT: 72 IN | TEMPERATURE: 100 F | OXYGEN SATURATION: 100 % | SYSTOLIC BLOOD PRESSURE: 138 MMHG

## 2023-12-06 DIAGNOSIS — J01.00 ACUTE NON-RECURRENT MAXILLARY SINUSITIS: Primary | ICD-10-CM

## 2023-12-06 DIAGNOSIS — H69.91 DYSFUNCTION OF RIGHT EUSTACHIAN TUBE: ICD-10-CM

## 2023-12-06 LAB
EXPIRATION DATE: NORMAL
FLUAV AG UPPER RESP QL IA.RAPID: NOT DETECTED
FLUBV AG UPPER RESP QL IA.RAPID: NOT DETECTED
INTERNAL CONTROL: NORMAL
Lab: NORMAL
SARS-COV-2 AG UPPER RESP QL IA.RAPID: NOT DETECTED

## 2023-12-06 PROCEDURE — 87428 SARSCOV & INF VIR A&B AG IA: CPT | Performed by: PHYSICIAN ASSISTANT

## 2023-12-06 PROCEDURE — 99214 OFFICE O/P EST MOD 30 MIN: CPT | Performed by: PHYSICIAN ASSISTANT

## 2023-12-06 PROCEDURE — 1159F MED LIST DOCD IN RCRD: CPT | Performed by: PHYSICIAN ASSISTANT

## 2023-12-06 PROCEDURE — 1160F RVW MEDS BY RX/DR IN RCRD: CPT | Performed by: PHYSICIAN ASSISTANT

## 2023-12-06 RX ORDER — PREDNISONE 20 MG/1
TABLET ORAL
Qty: 20 TABLET | Refills: 0 | Status: SHIPPED | OUTPATIENT
Start: 2023-12-06 | End: 2023-12-17

## 2023-12-06 RX ORDER — AMOXICILLIN 500 MG/1
CAPSULE ORAL
COMMUNITY
Start: 2023-11-30

## 2023-12-06 RX ORDER — DOXYCYCLINE 100 MG/1
100 CAPSULE ORAL 2 TIMES DAILY
Qty: 20 CAPSULE | Refills: 0 | Status: SHIPPED | OUTPATIENT
Start: 2023-12-06

## 2023-12-06 RX ORDER — CHLORCYCLIZINE HYDROCHLORIDE AND PSEUDOEPHEDRINE HYDROCHLORIDE 25; 60 MG/1; MG/1
1 TABLET ORAL 2 TIMES DAILY
Qty: 30 TABLET | Refills: 0 | Status: SHIPPED | OUTPATIENT
Start: 2023-12-06

## 2023-12-06 RX ORDER — FLUCONAZOLE 150 MG/1
TABLET ORAL
Qty: 2 TABLET | Refills: 0 | Status: SHIPPED | OUTPATIENT
Start: 2023-12-06

## 2023-12-06 RX ORDER — IPRATROPIUM BROMIDE 42 UG/1
SPRAY, METERED NASAL
COMMUNITY
Start: 2023-11-30

## 2023-12-06 NOTE — ASSESSMENT & PLAN NOTE
Recommended continue Flonase and hopefully the Stahist will help this, if sxs persist I recommended she see ENT for further evaluation.

## 2023-12-06 NOTE — PROGRESS NOTES
"Chief Complaint  Cough (X3 wks. Pt has been vaccinated for COVID) and Earache (Right Ear x3 wks)    Subjective          Lizzeth Tejada presents to [facility]  Cough  Associated symptoms include ear pain, a fever and a sore throat. Pertinent negatives include no chills or postnasal drip.   Earache   Associated symptoms include coughing and a sore throat.      patient is here today complaining of cough, congestion, right ear pain and fullness for the past 3 weeks.  She did state that early on she took a COVID test and was negative however she developed fever on Monday and wonders if she should have another COVID test done.  He says the fever has resolved but the Sinus pressure and ear pain is progressively worsening.    Objective   Vital Signs:   /82 (BP Location: Right arm)   Pulse 73   Temp 100 °F (37.8 °C)   Ht 182.9 cm (72\")   Wt 133 kg (293 lb 4.8 oz)   SpO2 100%   BMI 39.78 kg/m²     Body mass index is 39.78 kg/m².    Review of Systems   Constitutional:  Positive for fever. Negative for chills.   HENT:  Positive for ear pain, sore throat, swollen glands and trouble swallowing. Negative for postnasal drip and sinus pressure.    Respiratory:  Positive for cough.    Cardiovascular: Negative.        Past History:  Medical History: has a past medical history of Anxiety, Chronically dry eyes, Headache, and Tonsillitis.   Surgical History: has a past surgical history that includes Tonsillectomy (); Cholecystectomy; and  section.   Family History: family history includes Hypertension in her father; Lung cancer in her father; No Known Problems in her mother.   Social History: reports that she has never smoked. She has never used smokeless tobacco. She reports current alcohol use. She reports that she does not use drugs.      Current Outpatient Medications:     amoxicillin (AMOXIL) 500 MG capsule, , Disp: , Rfl:     guaiFENesin (Mucinex) 600 MG 12 hr tablet, Take 1 " tablet by mouth 2 (Two) Times a Day for 14 days., Disp: 28 tablet, Rfl: 0    ipratropium (ATROVENT) 0.06 % nasal spray, , Disp: , Rfl:     Chlorcyclizine-Pseudoephed (Stahist AD) 25-60 MG tablet, Take 1 tablet by mouth 2 (Two) Times a Day., Disp: 30 tablet, Rfl: 0    doxycycline (MONODOX) 100 MG capsule, Take 1 capsule by mouth 2 (Two) Times a Day., Disp: 20 capsule, Rfl: 0    fluconazole (Diflucan) 150 MG tablet, Take 1 now and repeat dose in 1 week, Disp: 2 tablet, Rfl: 0    predniSONE (DELTASONE) 20 MG tablet, Take 3 tablets by mouth Daily for 3 days, THEN 2 tablets Daily for 3 days, THEN 1 tablet Daily for 5 days., Disp: 20 tablet, Rfl: 0    Allergies: Codeine    Physical Exam  Vitals reviewed.   Constitutional:       Appearance: Normal appearance.   HENT:      Right Ear: Tympanic membrane, ear canal and external ear normal. A middle ear effusion is present.      Left Ear: Tympanic membrane, ear canal and external ear normal.      Nose: Congestion present.      Right Sinus: Maxillary sinus tenderness present.      Left Sinus: Maxillary sinus tenderness present.   Cardiovascular:      Rate and Rhythm: Normal rate and regular rhythm.      Heart sounds: Normal heart sounds. No murmur heard.  Pulmonary:      Effort: Pulmonary effort is normal. No respiratory distress.      Breath sounds: Normal breath sounds. No wheezing or rhonchi.   Musculoskeletal:      Cervical back: Neck supple.   Lymphadenopathy:      Cervical: No cervical adenopathy.   Neurological:      Mental Status: She is alert and oriented to person, place, and time.   Psychiatric:         Mood and Affect: Mood normal.         Behavior: Behavior normal.          Result Review :                   Assessment and Plan    Diagnoses and all orders for this visit:    1. Acute non-recurrent maxillary sinusitis (Primary)  Assessment & Plan:  Rx for Doxycycline, Steroid taper and stahist AD, Call or return if symptoms persist or worsen.     Orders:  -     POCT  SARS-CoV-2 Antigen BAO + Flu    2. Dysfunction of right eustachian tube  Assessment & Plan:  Recommended continue Flonase and hopefully the Stahist will help this, if sxs persist I recommended she see ENT for further evaluation.       Other orders  -     doxycycline (MONODOX) 100 MG capsule; Take 1 capsule by mouth 2 (Two) Times a Day.  Dispense: 20 capsule; Refill: 0  -     predniSONE (DELTASONE) 20 MG tablet; Take 3 tablets by mouth Daily for 3 days, THEN 2 tablets Daily for 3 days, THEN 1 tablet Daily for 5 days.  Dispense: 20 tablet; Refill: 0  -     Chlorcyclizine-Pseudoephed (Stahist AD) 25-60 MG tablet; Take 1 tablet by mouth 2 (Two) Times a Day.  Dispense: 30 tablet; Refill: 0  -     fluconazole (Diflucan) 150 MG tablet; Take 1 now and repeat dose in 1 week  Dispense: 2 tablet; Refill: 0        Follow Up   Return if symptoms worsen or fail to improve.  Patient was given instructions and counseling regarding her condition or for health maintenance advice. Please see specific information pulled into the AVS if appropriate.     Shavon Mitchell PA-C

## 2024-02-21 ENCOUNTER — OFFICE VISIT (OUTPATIENT)
Dept: FAMILY MEDICINE CLINIC | Facility: CLINIC | Age: 44
End: 2024-02-21
Payer: COMMERCIAL

## 2024-02-21 VITALS
WEIGHT: 290.7 LBS | HEART RATE: 72 BPM | HEIGHT: 72 IN | BODY MASS INDEX: 39.37 KG/M2 | SYSTOLIC BLOOD PRESSURE: 112 MMHG | DIASTOLIC BLOOD PRESSURE: 80 MMHG | OXYGEN SATURATION: 98 %

## 2024-02-21 DIAGNOSIS — F41.1 GAD (GENERALIZED ANXIETY DISORDER): ICD-10-CM

## 2024-02-21 DIAGNOSIS — N95.1 MENOPAUSAL SYNDROME: ICD-10-CM

## 2024-02-21 DIAGNOSIS — M62.838 TRAPEZIUS MUSCLE SPASM: ICD-10-CM

## 2024-02-21 DIAGNOSIS — Z00.00 ENCOUNTER FOR WELLNESS EXAMINATION IN ADULT: ICD-10-CM

## 2024-02-21 DIAGNOSIS — Z00.00 ENCOUNTER FOR WELLNESS EXAMINATION IN ADULT: Primary | ICD-10-CM

## 2024-02-21 DIAGNOSIS — G47.00 INSOMNIA, UNSPECIFIED TYPE: Primary | ICD-10-CM

## 2024-02-21 RX ORDER — TRAZODONE HYDROCHLORIDE 50 MG/1
50 TABLET ORAL NIGHTLY
Qty: 30 TABLET | Refills: 5 | Status: SHIPPED | OUTPATIENT
Start: 2024-02-21

## 2024-02-21 NOTE — ASSESSMENT & PLAN NOTE
Psychological condition is newly identified.  We will wait and try to treat insomnia first with trazodone.  We did discuss GeneSight testing as well  Psychological condition  will be reassessed at the next regular appointment.

## 2024-02-21 NOTE — ASSESSMENT & PLAN NOTE
Patient was shown stretching exercises and encouraged to use the muscle relaxer that she received from the ED.  She may use heat and will call if symptoms recur

## 2024-02-21 NOTE — PROGRESS NOTES
Date: 2024   Patient Name: Lizzeth Tejada  : 1980   MRN: [id number]     Chief Complaint:    Chief Complaint   Patient presents with    Insomnia       History of Present Illness: Lizzeth Tejada is a 43 y.o. female who is here today for insomnia.  She will attempt to go to sleep around 10:00 and does not fall asleep until 1 or 2 AM.  She gets up at 5 for work.  She reports that she does not feel fatigued throughout the day until around 9 PM at night however is constantly moving throughout the day.  She has no known history of sleep apnea and has never had a sleep study.  She has also been experiencing worsening daily anxiety and feels that she may need to discuss medication for this as well.    She presented to the ED secondary to numbness down her left upper extremity that was reproduced with certain movements.  She was having a lot of pain and spasm in her left neck, shoulder, and around the left clavicle.  Cardiac etiology was ruled out in the ED.  Numbness has since resolved.         Review of Systems:   Review of Systems   Constitutional:  Negative for chills, fatigue and fever.   Respiratory:  Negative for cough and shortness of breath.    Cardiovascular:  Negative for chest pain and palpitations.   Gastrointestinal:  Negative for abdominal pain, constipation, diarrhea, nausea and vomiting.   Musculoskeletal:  Positive for myalgias, neck pain and neck stiffness. Negative for back pain.   Neurological:  Positive for numbness. Negative for dizziness and headache.   Psychiatric/Behavioral:  Positive for sleep disturbance and stress. Negative for depressed mood. The patient is nervous/anxious and has insomnia.        Past Medical History:   Past Medical History:   Diagnosis Date    Anxiety     Chronically dry eyes     Headache     Tonsillitis        Past Surgical History:   Past Surgical History:   Procedure Laterality Date    ADENOIDECTOMY       SECTION      CHOLECYSTECTOMY       "FRACTURE SURGERY      TONSILLECTOMY  2006       Family History:   Family History   Problem Relation Age of Onset    No Known Problems Mother     Hypertension Father     Lung cancer Father     Anxiety disorder Father     Cancer Father     Alcohol abuse Brother        Social History:   Social History     Socioeconomic History    Marital status: Unknown   Tobacco Use    Smoking status: Never    Smokeless tobacco: Never   Vaping Use    Vaping Use: Never used   Substance and Sexual Activity    Alcohol use: Yes     Comment: Socially only 1-2 a momth    Drug use: Never    Sexual activity: Not Currently     Partners: Male     Birth control/protection: Abstinence       Medications:     Current Outpatient Medications:     traZODone (DESYREL) 50 MG tablet, Take 1 tablet by mouth Every Night., Disp: 30 tablet, Rfl: 5    Allergies:   Allergies   Allergen Reactions    Codeine Hives     Difficulty breathing          Physical Exam:  Vital Signs:   Vitals:    02/21/24 0833   BP: 112/80   BP Location: Right arm   Patient Position: Sitting   Cuff Size: Large Adult   Pulse: 72   SpO2: 98%   Weight: 132 kg (290 lb 11.2 oz)   Height: 182.9 cm (72\")     Body mass index is 39.43 kg/m².     Physical Exam  Vitals and nursing note reviewed.   Constitutional:       Appearance: Normal appearance. She is obese.   HENT:      Head: Normocephalic and atraumatic.   Cardiovascular:      Rate and Rhythm: Normal rate and regular rhythm.      Heart sounds: Normal heart sounds. No murmur heard.  Pulmonary:      Effort: Pulmonary effort is normal.      Breath sounds: Normal breath sounds. No wheezing.   Musculoskeletal:      Comments: Significant spasm and hypertonicity of bilateral trapezius at the neck and shoulders.  Very tight pectoralis muscles bilaterally   Neurological:      Mental Status: She is alert and oriented to person, place, and time. Mental status is at baseline.   Psychiatric:         Mood and Affect: Mood normal.         Behavior: " Behavior normal.           Assessment/Plan:   Diagnoses and all orders for this visit:    1. Insomnia, unspecified type (Primary)  Assessment & Plan:  Rx trazodone 50 mg at bedtime.  We did discuss the potential need for sleep study should she not respond to pharmacologic therapy.  Side effects discussed.    Orders:  -     traZODone (DESYREL) 50 MG tablet; Take 1 tablet by mouth Every Night.  Dispense: 30 tablet; Refill: 5    2. NOAM (generalized anxiety disorder)  Assessment & Plan:  Psychological condition is newly identified.  We will wait and try to treat insomnia first with trazodone.  We did discuss GeneSight testing as well  Psychological condition  will be reassessed at the next regular appointment.      3. Trapezius muscle spasm  Assessment & Plan:  Patient was shown stretching exercises and encouraged to use the muscle relaxer that she received from the ED.  She may use heat and will call if symptoms recur             Follow Up:   Return in about 6 weeks (around 4/3/2024) for Med Recheck.    Chelly Oliva, DO  [facility]

## 2024-02-21 NOTE — ASSESSMENT & PLAN NOTE
Rx trazodone 50 mg at bedtime.  We did discuss the potential need for sleep study should she not respond to pharmacologic therapy.  Side effects discussed.

## 2024-02-22 LAB
ALBUMIN SERPL-MCNC: 4.5 G/DL (ref 3.9–4.9)
ALBUMIN/GLOB SERPL: 1.7 {RATIO} (ref 1.2–2.2)
ALP SERPL-CCNC: 66 IU/L (ref 44–121)
ALT SERPL-CCNC: 13 IU/L (ref 0–32)
AST SERPL-CCNC: 15 IU/L (ref 0–40)
BASOPHILS # BLD AUTO: 0.1 X10E3/UL (ref 0–0.2)
BASOPHILS NFR BLD AUTO: 1 %
BILIRUB SERPL-MCNC: 0.5 MG/DL (ref 0–1.2)
BUN SERPL-MCNC: 16 MG/DL (ref 6–24)
BUN/CREAT SERPL: 18 (ref 9–23)
CALCIUM SERPL-MCNC: 9.8 MG/DL (ref 8.7–10.2)
CHLORIDE SERPL-SCNC: 105 MMOL/L (ref 96–106)
CHOLEST SERPL-MCNC: 152 MG/DL (ref 100–199)
CO2 SERPL-SCNC: 21 MMOL/L (ref 20–29)
CREAT SERPL-MCNC: 0.91 MG/DL (ref 0.57–1)
EGFRCR SERPLBLD CKD-EPI 2021: 79 ML/MIN/1.73
EOSINOPHIL # BLD AUTO: 0.1 X10E3/UL (ref 0–0.4)
EOSINOPHIL NFR BLD AUTO: 1 %
ERYTHROCYTE [DISTWIDTH] IN BLOOD BY AUTOMATED COUNT: 12.6 % (ref 11.7–15.4)
GLOBULIN SER CALC-MCNC: 2.7 G/DL (ref 1.5–4.5)
GLUCOSE SERPL-MCNC: 90 MG/DL (ref 70–99)
HBA1C MFR BLD: 5.1 % (ref 4.8–5.6)
HCT VFR BLD AUTO: 43 % (ref 34–46.6)
HDLC SERPL-MCNC: 66 MG/DL
HGB BLD-MCNC: 14.1 G/DL (ref 11.1–15.9)
IMM GRANULOCYTES # BLD AUTO: 0 X10E3/UL (ref 0–0.1)
IMM GRANULOCYTES NFR BLD AUTO: 0 %
LDLC SERPL CALC-MCNC: 72 MG/DL (ref 0–99)
LYMPHOCYTES # BLD AUTO: 1.7 X10E3/UL (ref 0.7–3.1)
LYMPHOCYTES NFR BLD AUTO: 27 %
MCH RBC QN AUTO: 32.8 PG (ref 26.6–33)
MCHC RBC AUTO-ENTMCNC: 32.8 G/DL (ref 31.5–35.7)
MCV RBC AUTO: 100 FL (ref 79–97)
MONOCYTES # BLD AUTO: 0.6 X10E3/UL (ref 0.1–0.9)
MONOCYTES NFR BLD AUTO: 9 %
NEUTROPHILS # BLD AUTO: 3.9 X10E3/UL (ref 1.4–7)
NEUTROPHILS NFR BLD AUTO: 62 %
PLATELET # BLD AUTO: 248 X10E3/UL (ref 150–450)
POTASSIUM SERPL-SCNC: 4.8 MMOL/L (ref 3.5–5.2)
PROGEST SERPL-MCNC: 8.7 NG/ML
PROT SERPL-MCNC: 7.2 G/DL (ref 6–8.5)
RBC # BLD AUTO: 4.3 X10E6/UL (ref 3.77–5.28)
SODIUM SERPL-SCNC: 139 MMOL/L (ref 134–144)
T4 FREE SERPL-MCNC: 1.19 NG/DL (ref 0.82–1.77)
TRIGL SERPL-MCNC: 75 MG/DL (ref 0–149)
TSH SERPL DL<=0.005 MIU/L-ACNC: 0.87 UIU/ML (ref 0.45–4.5)
VLDLC SERPL CALC-MCNC: 14 MG/DL (ref 5–40)
WBC # BLD AUTO: 6.3 X10E3/UL (ref 3.4–10.8)

## 2024-02-27 LAB
TESTOST FREE SERPL-MCNC: 0.6 PG/ML (ref 0–4.2)
TESTOST SERPL-MCNC: 8 NG/DL (ref 4–50)

## 2024-02-27 RX ORDER — METHOCARBAMOL 750 MG/1
TABLET, FILM COATED ORAL
Qty: 60 TABLET | Refills: 2 | Status: SHIPPED | OUTPATIENT
Start: 2024-02-27

## 2024-02-28 LAB — ESTROGEN SERPL-MCNC: 168 PG/ML

## 2024-03-08 ENCOUNTER — OFFICE VISIT (OUTPATIENT)
Dept: FAMILY MEDICINE CLINIC | Facility: CLINIC | Age: 44
End: 2024-03-08
Payer: COMMERCIAL

## 2024-03-08 VITALS
DIASTOLIC BLOOD PRESSURE: 66 MMHG | OXYGEN SATURATION: 97 % | HEART RATE: 67 BPM | HEIGHT: 72 IN | BODY MASS INDEX: 39.68 KG/M2 | WEIGHT: 293 LBS | SYSTOLIC BLOOD PRESSURE: 100 MMHG

## 2024-03-08 DIAGNOSIS — J02.9 SORE THROAT: ICD-10-CM

## 2024-03-08 DIAGNOSIS — J00 ACUTE NASOPHARYNGITIS: Primary | ICD-10-CM

## 2024-03-08 LAB
EXPIRATION DATE: NORMAL
EXPIRATION DATE: NORMAL
FLUAV AG UPPER RESP QL IA.RAPID: NOT DETECTED
FLUBV AG UPPER RESP QL IA.RAPID: NOT DETECTED
INTERNAL CONTROL: NORMAL
INTERNAL CONTROL: NORMAL
Lab: NORMAL
Lab: NORMAL
S PYO AG THROAT QL: NEGATIVE
SARS-COV-2 AG UPPER RESP QL IA.RAPID: NOT DETECTED

## 2024-03-08 PROCEDURE — 1160F RVW MEDS BY RX/DR IN RCRD: CPT | Performed by: PHYSICIAN ASSISTANT

## 2024-03-08 PROCEDURE — 87428 SARSCOV & INF VIR A&B AG IA: CPT | Performed by: PHYSICIAN ASSISTANT

## 2024-03-08 PROCEDURE — 1159F MED LIST DOCD IN RCRD: CPT | Performed by: PHYSICIAN ASSISTANT

## 2024-03-08 PROCEDURE — 99213 OFFICE O/P EST LOW 20 MIN: CPT | Performed by: PHYSICIAN ASSISTANT

## 2024-03-08 PROCEDURE — 87880 STREP A ASSAY W/OPTIC: CPT | Performed by: PHYSICIAN ASSISTANT

## 2024-03-12 PROBLEM — U07.1 UPPER RESPIRATORY TRACT INFECTION DUE TO COVID-19 VIRUS: Status: ACTIVE | Noted: 2024-03-12

## 2024-03-12 PROBLEM — J06.9 UPPER RESPIRATORY TRACT INFECTION DUE TO COVID-19 VIRUS: Status: RESOLVED | Noted: 2024-03-12 | Resolved: 2024-03-12

## 2024-03-12 PROBLEM — U07.1 UPPER RESPIRATORY TRACT INFECTION DUE TO COVID-19 VIRUS: Status: RESOLVED | Noted: 2024-03-12 | Resolved: 2024-03-12

## 2024-03-12 PROBLEM — J00 ACUTE NASOPHARYNGITIS: Status: ACTIVE | Noted: 2024-03-12

## 2024-03-12 PROBLEM — J06.9 UPPER RESPIRATORY TRACT INFECTION DUE TO COVID-19 VIRUS: Status: ACTIVE | Noted: 2024-03-12

## 2024-03-12 NOTE — PROGRESS NOTES
"Chief Complaint  Earache, Sore Throat, and Headache    Subjective        Lizzeth Tejada presents to [facility]  History of Present Illness  Patient reports today secondary to waking with sore throat, headache and earache.  Patient denies any fever or chills.  Patient denies cough.  Patient denies any shortness of breath or difficulty breathing.  Earache   Associated symptoms include headaches and a sore throat.   Sore Throat   Associated symptoms include ear pain and headaches.   Headache      Objective   Vital Signs:  /66   Pulse 67   Ht 182.9 cm (72\")   Wt 133 kg (293 lb)   SpO2 97%   BMI 39.74 kg/m²   Estimated body mass index is 39.74 kg/m² as calculated from the following:    Height as of this encounter: 182.9 cm (72\").    Weight as of this encounter: 133 kg (293 lb).             Physical Exam  Vitals and nursing note reviewed.   Constitutional:       General: She is not in acute distress.     Appearance: Normal appearance.   HENT:      Head: Normocephalic.      Right Ear: Hearing, tympanic membrane and ear canal normal.      Left Ear: Hearing, tympanic membrane and ear canal normal.      Mouth/Throat:      Pharynx: No oropharyngeal exudate or posterior oropharyngeal erythema.   Eyes:      Pupils: Pupils are equal, round, and reactive to light.   Cardiovascular:      Rate and Rhythm: Normal rate and regular rhythm.   Pulmonary:      Effort: Pulmonary effort is normal. No respiratory distress.      Breath sounds: No wheezing.   Skin:     General: Skin is warm and dry.   Neurological:      Mental Status: She is alert.   Psychiatric:         Mood and Affect: Mood normal.        Result Review :                     Assessment and Plan     Diagnoses and all orders for this visit:    1. Acute nasopharyngitis (Primary)  Assessment & Plan:  Patient negative for COVID, strep and flu.  Given symptoms started today advised patient to take over-the-counter medication, increase " fluids and rest.  Also advised patient if she does not improve she may want to retest in 5 days      2. Sore throat  -     POCT SARS-CoV-2 Antigen BAO + Flu  -     POCT rapid strep A             Follow Up     Return if symptoms worsen or fail to improve.  Patient was given instructions and counseling regarding her condition or for health maintenance advice. Please see specific information pulled into the AVS if appropriate.

## 2024-03-12 NOTE — ASSESSMENT & PLAN NOTE
Patient negative for COVID, strep and flu.  Given symptoms started today advised patient to take over-the-counter medication, increase fluids and rest.  Also advised patient if she does not improve she may want to retest in 5 days

## 2024-03-28 ENCOUNTER — OFFICE VISIT (OUTPATIENT)
Dept: FAMILY MEDICINE CLINIC | Facility: CLINIC | Age: 44
End: 2024-03-28
Payer: COMMERCIAL

## 2024-03-28 VITALS
DIASTOLIC BLOOD PRESSURE: 68 MMHG | HEART RATE: 71 BPM | BODY MASS INDEX: 39.68 KG/M2 | OXYGEN SATURATION: 99 % | SYSTOLIC BLOOD PRESSURE: 100 MMHG | WEIGHT: 293 LBS | HEIGHT: 72 IN

## 2024-03-28 DIAGNOSIS — F41.1 GAD (GENERALIZED ANXIETY DISORDER): Primary | ICD-10-CM

## 2024-03-28 DIAGNOSIS — G47.00 INSOMNIA, UNSPECIFIED TYPE: ICD-10-CM

## 2024-03-28 DIAGNOSIS — E66.01 MORBID OBESITY: ICD-10-CM

## 2024-03-28 RX ORDER — TRAZODONE HYDROCHLORIDE 50 MG/1
50 TABLET ORAL NIGHTLY
Qty: 90 TABLET | Refills: 1 | Status: SHIPPED | OUTPATIENT
Start: 2024-03-28

## 2024-03-28 NOTE — PROGRESS NOTES
Date: 2024   Patient Name: Lizzeth Tejada  : 1980   MRN: [id number]     Chief Complaint:    Chief Complaint   Patient presents with    Insomnia       History of Present Illness: Lizzeth Tejada is a 43 y.o. female who is here today to follow up for insomnia and anxiety. She reports improvement in sleep with addition of trazodone and does feel her anxiety has improved slightly with improvement in sleep. Her family however still feels strongly about her needing medication for her anxiety and she is open to this discussion.     She is also struggling with her weight but does not desire to pursue medical or surgical weight loss at this time. She is working on diet and exercise but would like to discuss other recommendations.          Review of Systems:   Review of Systems   Constitutional:  Negative for chills, fatigue and fever.   Respiratory:  Negative for cough and shortness of breath.    Cardiovascular:  Negative for chest pain and palpitations.   Gastrointestinal:  Negative for abdominal pain, constipation, diarrhea, nausea and vomiting.   Musculoskeletal:  Negative for back pain and myalgias.   Neurological:  Negative for dizziness and headache.   Psychiatric/Behavioral:  Negative for sleep disturbance and depressed mood. The patient is nervous/anxious.        Past Medical History:   Past Medical History:   Diagnosis Date    Anxiety     Chronically dry eyes     Headache     Tonsillitis        Past Surgical History:   Past Surgical History:   Procedure Laterality Date    ADENOIDECTOMY       SECTION      CHOLECYSTECTOMY      COLONOSCOPY  2024    FRACTURE SURGERY      TONSILLECTOMY  2006       Family History:   Family History   Problem Relation Age of Onset    No Known Problems Mother     Hypertension Father     Lung cancer Father     Anxiety disorder Father     Cancer Father     Alcohol abuse Brother        Social History:   Social History     Socioeconomic History    Marital  "status: Unknown   Tobacco Use    Smoking status: Never    Smokeless tobacco: Never   Vaping Use    Vaping status: Never Used   Substance and Sexual Activity    Alcohol use: Yes     Comment: Socially only 1-2 a momth    Drug use: Never    Sexual activity: Not Currently     Partners: Male     Birth control/protection: Abstinence       Medications:     Current Outpatient Medications:     traZODone (DESYREL) 50 MG tablet, Take 1 tablet by mouth Every Night., Disp: 90 tablet, Rfl: 1    Allergies:   Allergies   Allergen Reactions    Codeine Hives     Difficulty breathing        PHQ-2 Total Score:     PHQ-9 Total Score:       Physical Exam:  Vital Signs:   Vitals:    03/28/24 1415   BP: 100/68   BP Location: Right arm   Patient Position: Sitting   Cuff Size: Large Adult   Pulse: 71   SpO2: 99%   Weight: 135 kg (297 lb 1.6 oz)   Height: 182.9 cm (72\")     Body mass index is 40.29 kg/m².     Physical Exam  Vitals and nursing note reviewed.   Constitutional:       Appearance: Normal appearance. She is obese.   HENT:      Head: Normocephalic.   Pulmonary:      Effort: Pulmonary effort is normal.   Neurological:      Mental Status: She is alert and oriented to person, place, and time.   Psychiatric:         Mood and Affect: Mood normal.         Behavior: Behavior normal.           Assessment/Plan:   Diagnoses and all orders for this visit:    1. NOAM (generalized anxiety disorder) (Primary)  Assessment & Plan:  Psychological condition is unchanged.  GeneSight testing performed in office today, will discuss treatment once this has resulted  Psychological condition  will be reassessed at the next regular appointment.      2. Insomnia, unspecified type  Assessment & Plan:  Improving with trazodone    Orders:  -     traZODone (DESYREL) 50 MG tablet; Take 1 tablet by mouth Every Night.  Dispense: 90 tablet; Refill: 1    3. Morbid obesity  Assessment & Plan:  Patient's (Body mass index is 40.29 kg/m².) indicates that they are " morbidly/severely obese (BMI > 40 or > 35 with obesity - related health condition) with health conditions that include none . Weight is worsening. BMI  is above average; BMI management plan is completed. We discussed portion control, increasing exercise, joining a fitness center or start home based exercise program, and management of depression/anxiety/stress to control compensatory eating.   We also discussed her consulting a nutritionist to help with diet changes and macros.              Follow Up:   Return in about 8 weeks (around 5/23/2024) for Med Recheck.    Chelly Oliva,   [facility]

## 2024-03-29 NOTE — ASSESSMENT & PLAN NOTE
Patient's (Body mass index is 40.29 kg/m².) indicates that they are morbidly/severely obese (BMI > 40 or > 35 with obesity - related health condition) with health conditions that include none . Weight is worsening. BMI  is above average; BMI management plan is completed. We discussed portion control, increasing exercise, joining a fitness center or start home based exercise program, and management of depression/anxiety/stress to control compensatory eating.   We also discussed her consulting a nutritionist to help with diet changes and macros.

## 2024-03-29 NOTE — ASSESSMENT & PLAN NOTE
Psychological condition is unchanged.  Alverix testing performed in office today, will discuss treatment once this has resulted  Psychological condition  will be reassessed at the next regular appointment.

## 2024-07-10 ENCOUNTER — TRANSCRIBE ORDERS (OUTPATIENT)
Dept: PHYSICAL THERAPY | Facility: CLINIC | Age: 44
End: 2024-07-10
Payer: COMMERCIAL

## 2024-07-10 ENCOUNTER — TREATMENT (OUTPATIENT)
Dept: PHYSICAL THERAPY | Facility: CLINIC | Age: 44
End: 2024-07-10
Payer: COMMERCIAL

## 2024-07-10 DIAGNOSIS — G89.29 CHRONIC PAIN OF RIGHT KNEE: Primary | ICD-10-CM

## 2024-07-10 DIAGNOSIS — M25.561 CHRONIC PAIN OF RIGHT KNEE: Primary | ICD-10-CM

## 2024-07-10 DIAGNOSIS — M76.51 PATELLAR TENDINITIS OF RIGHT KNEE: ICD-10-CM

## 2024-07-10 NOTE — PROGRESS NOTES
Physical Therapy Initial Evaluation and Plan of Care    [facility]  [address]  [place] [address]    Patient: Lizzeth Tejada   : 1980  Diagnosis/ICD-10 Code:  Chronic pain of right knee [M25.561, G89.29]  Referring practitioner: Mando PRICE MD  Date of Initial Visit: 7/10/2024  Today's Date: 7/10/2024  Patient seen for 1 sessions           Subjective Questionnaire: LEFS: 61/80      Subjective Evaluation    History of Present Illness  Date of onset: 2024  Mechanism of injury: R knee pain and instability. Patient reports having chronic instability and intermittent but she aggravated her knee when she was knocked off her balance and landed on her knee while walking down stairs. She started getting daily symptoms after this even though she still does all activities without restriction. J brace does not improve symptoms. Her surgeon discussed MPFR and tibial tubercle osteotomy as options but wants PT to eccentrically load the patellar tendon. MRI revealed significant R petallofemoral chondral loss and mild medial compartment OA.    CLOF: limps after 1 mile and pain walking after 2 miles      Patient Occupation: rep for orthopedic [employer]  Current pain ratin  At best pain ratin  At worst pain ratin  Exacerbated by: kneeling, hiking, after prolonged sitting.    Patient Goals  Patient goals for therapy: decreased pain, increased strength, return to sport/leisure activities and independence with ADLs/IADLs           Objective          Tenderness     Right Knee   Tenderness in the inferior fat pad, lateral patella and patellar tendon.     Active Range of Motion   Left Knee   Flexion: 144 degrees   Extension: Left knee active extension: lacking 1.     Right Knee   Flexion: 130 degrees   Extension: Right knee active extension: lacking 5, pain.     Passive Range of Motion     Right Knee   Flexion: with pain  Extension: with pain    Patellar Mobility    Left Knee Patellar tendons within functional limits include the medial, lateral, superior and inferior.     Right Knee Patellar tendons within functional limits include the medial, superior and inferior. Hypermobile in the lateral patellar tendon(s).     Strength/Myotome Testing     Left Hip   Planes of Motion   Abduction: 4+  External rotation: 5    Right Hip   Planes of Motion   Abduction: 4-  External rotation: 4+    Left Knee   Flexion: 5  Extension: 4- (pain)  Quadriceps contraction: good    Right Knee   Flexion: 5  Extension: 3+ (pain and crepitus)  Quadriceps contraction: fair    Tests     Right Knee   Negative valgus stress test at 0 degrees, valgus stress test at 30 degrees, varus stress test at 0 degrees and varus stress test at 30 degrees.           Assessment & Plan       Assessment  Impairments: abnormal gait, abnormal muscle firing, abnormal or restricted ROM, activity intolerance, impaired balance, impaired physical strength, lacks appropriate home exercise program, pain with function, safety issue and weight-bearing intolerance   Functional limitations: carrying objects, walking, uncomfortable because of pain, standing and stooping   Assessment details: Patient is a 44-year-old female who presents with recurrent right anterior knee pain, which recurred when she was thrown off balance descending stairs and felt the front part of her right knee strain.  MRI revealed severe right patellofemoral OA, mild to moderate tibiofemoral OA, and patellar tendinosis with partial tearing at the tendo-osseous junction with the tibia.  Palpation of right patellar tendon reproduces most of her symptoms, especially at the distal aspect.  Patient also lacks right TKE which will need to be normalized to create optimal mechanics at the knee.  Trialed cross friction to right patellar tendon, with patient advised to complete open chain quadriceps eccentric loading 3X/week at her gym.  Barriers to therapy: Chronicity,  comorbidities  Prognosis: good    Goals  Plan Goals: Short term goals (4 weeks)  1. Patient to be compliant with initial HEP.  2. Patient to demonstrate symmetrical knee PROM.  3. Patient to improve LEFS to greater than or equal to 65/80.    Long Term goals (8 weeks)  1. Patient to demonstrate symmetrical knee AROM.  2. Patient to demonstrate pain free and normal strength with MMTs.  3. Patient to ascend and descend eight 8 inch steps reciprocally with one handrail with minimal to no antalgia or difficulty.  4. Patient to demonstrate independence with home exercise program.  5. Patient to improve LEFS to greater than or equal to 69/80.       Plan  Therapy options: will be seen for skilled therapy services  Planned modality interventions: thermotherapy (hydrocollator packs), TENS, cryotherapy and dry needling  Planned therapy interventions: manual therapy, neuromuscular re-education, soft tissue mobilization, strengthening, stretching, therapeutic activities, joint mobilization, home exercise program, functional ROM exercises, balance/weight-bearing training, abdominal trunk stabilization, postural training, motor coordination training, spinal/joint mobilization, ADL retraining, transfer training, body mechanics training and gait training  Frequency: 2x week  Duration in weeks: 8  Treatment plan discussed with: patient        Timed:  Manual Therapy:    10     mins  24296;  Therapeutic Exercise:    15     mins  87684;     Neuromuscular Katty:    0    mins  72214;    Therapeutic Activity:     13     mins  42221;     Gait Trainin     mins  81440;     Ultrasound:     0     mins  39996;    Electrical Stimulation:    0     mins  27877 ( );    Untimed:  Electrical Stimulation:    0     mins  41515 ( );  Mechanical Traction:    0     mins  20984;   Dry Needlin     mins      Timed Treatment:   38   mins   Total Treatment:     65   mins    PT SIGNATURE: Marcos Koroma PT   License Number:  [id number]  DATE TREATMENT INITIATED: 7/10/2024    Initial Certification  Certification Period: 10/8/2024  I certify that the therapy services are furnished while this patient is under my care.  The services outlined above are required by this patient, and will be reviewed every 90 days.     PHYSICIAN: Mando Mcwilliams MD      DATE:     Please sign and return via fax to [phone].. Thank you, [facility].

## 2024-07-15 ENCOUNTER — TREATMENT (OUTPATIENT)
Dept: PHYSICAL THERAPY | Facility: CLINIC | Age: 44
End: 2024-07-15
Payer: COMMERCIAL

## 2024-07-15 DIAGNOSIS — M25.561 CHRONIC PAIN OF RIGHT KNEE: Primary | ICD-10-CM

## 2024-07-15 DIAGNOSIS — G89.29 CHRONIC PAIN OF RIGHT KNEE: Primary | ICD-10-CM

## 2024-07-15 DIAGNOSIS — M76.51 PATELLAR TENDINITIS OF RIGHT KNEE: ICD-10-CM

## 2024-07-15 NOTE — PROGRESS NOTES
Physical Therapy Daily Progress Note    [facility]  [address]  [place] [address]      Patient: Lizzeth Tejada   : 1980  Diagnosis/ICD-10 Code:  Chronic pain of right knee [M25.561, G89.29]  Referring practitioner: Mando PRICE MD  Date of Initial Visit: Type: THERAPY  Noted: 7/10/2024  Today's Date: 7/15/2024  Patient seen for 2 sessions           Subjective   Patient reports her R knee felt a little better walking after last visit.     Objective   See Exercise, Manual, and Modality Logs for complete treatment.       Assessment/Plan  R knee now lacking 3 deg of TKE, better than at eval. Continued cross friction to R patellar tendon with symptom improvement noted. Modified PROM R extension stretching and progressed R quad eccentrics, today in CKC. Also introduced R Achilles eccentrics  due to hx of repair and c/o tightness in the presence of R knee flexion contracture and posterior knee tightness.            Timed:  Manual Therapy:    12     mins  11525;  Therapeutic Exercise:    18     mins  91056;     Neuromuscular Katty:   0    mins  18466;    Therapeutic Activity:     10     mins  26405;     Gait Trainin     mins  73194;     Ultrasound:     0     mins  72547;    Electrical Stimulation:    0     mins  10279 ( );    Untimed:  Electrical Stimulation:    0     mins  55535 ( );  Mechanical Traction:    0     mins  09927;   Dry Needlin     mins     Timed Treatment:   40   mins   Total Treatment:     40   mins    Marcos Koroma PT  Physical Therapist

## 2024-07-18 ENCOUNTER — TREATMENT (OUTPATIENT)
Dept: PHYSICAL THERAPY | Facility: CLINIC | Age: 44
End: 2024-07-18
Payer: COMMERCIAL

## 2024-07-18 DIAGNOSIS — M25.561 CHRONIC PAIN OF RIGHT KNEE: Primary | ICD-10-CM

## 2024-07-18 DIAGNOSIS — M76.51 PATELLAR TENDINITIS OF RIGHT KNEE: ICD-10-CM

## 2024-07-18 DIAGNOSIS — G89.29 CHRONIC PAIN OF RIGHT KNEE: Primary | ICD-10-CM

## 2024-07-18 NOTE — PROGRESS NOTES
Physical Therapy Daily Progress Note    [facility]  [address]  [place] [address]      Patient: Lizzeth Tejada   : 1980  Diagnosis/ICD-10 Code:  Chronic pain of right knee [M25.561, G89.29]  Referring practitioner: Mando PRICE MD  Date of Initial Visit: Type: THERAPY  Noted: 7/10/2024  Today's Date: 2024  Patient seen for 3 sessions           Subjective   Patient reports her R patellar tendon felt better for a day or 2 after last visit but she aggravated her R patella when she felt like it became unstable.  She notes increased peripatellar pain the rest of the night and into this morning.    Objective   See Exercise, Manual, and Modality Logs for complete treatment.       Assessment/Plan  Modified IASTM to right distal patellar tendon, with no change in symptoms noted afterward.  Added STM to right medial gastroc due to posterior knee tightness, after which mild improvements were reported.  Held most other exercises due to patient either completing an earlier today or last night and presence of mild increased right knee edema.            Timed:  Manual Therapy:    15     mins  17089;  Therapeutic Exercise:    13     mins  75261;     Neuromuscular Katty:   0    mins  57840;    Therapeutic Activity:     10     mins  66910;     Gait Trainin     mins  75813;     Ultrasound:     0     mins  97646;    Electrical Stimulation:    0     mins  46872 ( );    Untimed:  Electrical Stimulation:    0     mins  22091 ( );  Mechanical Traction:    0     mins  08817;   Dry Needlin     mins     Timed Treatment:   38   mins   Total Treatment:     38   mins    Marcos Koroma PT  Physical Therapist

## 2024-07-22 ENCOUNTER — TREATMENT (OUTPATIENT)
Dept: PHYSICAL THERAPY | Facility: CLINIC | Age: 44
End: 2024-07-22
Payer: COMMERCIAL

## 2024-07-22 DIAGNOSIS — M25.561 CHRONIC PAIN OF RIGHT KNEE: Primary | ICD-10-CM

## 2024-07-22 DIAGNOSIS — G89.29 CHRONIC PAIN OF RIGHT KNEE: Primary | ICD-10-CM

## 2024-07-22 DIAGNOSIS — M76.51 PATELLAR TENDINITIS OF RIGHT KNEE: ICD-10-CM

## 2024-07-22 PROCEDURE — 97140 MANUAL THERAPY 1/> REGIONS: CPT | Performed by: PHYSICAL THERAPIST

## 2024-07-22 PROCEDURE — 97110 THERAPEUTIC EXERCISES: CPT | Performed by: PHYSICAL THERAPIST

## 2024-07-22 PROCEDURE — 97112 NEUROMUSCULAR REEDUCATION: CPT | Performed by: PHYSICAL THERAPIST

## 2024-07-22 NOTE — PROGRESS NOTES
Physical Therapy Daily Progress Note    [facility]  [address]  [place] [address]      Patient: Lizzeth Tejada   : 1980  Diagnosis/ICD-10 Code:  Chronic pain of right knee [M25.561, G89.29]  Referring practitioner: Mando PRICE MD  Date of Initial Visit: Type: THERAPY  Noted: 7/10/2024  Today's Date: 2024  Patient seen for 4 sessions           Subjective   Patient reports significant pain relief for 1-2 days after last visit.     Objective   See Exercise, Manual, and Modality Logs for complete treatment.       Assessment/Plan  Continued IASTM to right distal patellar tendon insertion, she reported more local pain during intervention today.  Trialed multiple TKE strengthening strategies, with mild increase in knee pain and clicking when in stance.  Less pain reported during SLR.            Timed:  Manual Therapy:    8     mins  22808;  Therapeutic Exercise:    8     mins  58873;     Neuromuscular Katty:   14    mins  07903;    Therapeutic Activity:     0     mins  74473;     Gait Trainin     mins  24583;     Ultrasound:     0     mins  55017;    Electrical Stimulation:    0     mins  63856 ( );    Untimed:  Electrical Stimulation:    0     mins  13337 ( );  Mechanical Traction:    0     mins  19742;   Dry Needlin     mins     Timed Treatment:   30   mins   Total Treatment:     30   mins    Marcos Koroma PT  Physical Therapist

## 2024-07-25 ENCOUNTER — TREATMENT (OUTPATIENT)
Dept: PHYSICAL THERAPY | Facility: CLINIC | Age: 44
End: 2024-07-25
Payer: COMMERCIAL

## 2024-07-25 DIAGNOSIS — M25.561 CHRONIC PAIN OF RIGHT KNEE: Primary | ICD-10-CM

## 2024-07-25 DIAGNOSIS — G89.29 CHRONIC PAIN OF RIGHT KNEE: Primary | ICD-10-CM

## 2024-07-25 DIAGNOSIS — M76.51 PATELLAR TENDINITIS OF RIGHT KNEE: ICD-10-CM

## 2024-07-25 NOTE — PROGRESS NOTES
Physical Therapy Daily Progress Note    [facility]  [address]  [place] [address]      Patient: Lizzeth Tejada   : 1980  Diagnosis/ICD-10 Code:  Chronic pain of right knee [M25.561, G89.29]  Referring practitioner: Mando PRICE MD  Date of Initial Visit: Type: THERAPY  Noted: 7/10/2024  Today's Date: 2024  Patient seen for 5 sessions           Subjective   Patient reports her R patellar tendon feels less stiff after prolonged sitting, plus improved walking tolerance/distance prior to pain onset. Tolerance for kneeling on her R knee and pain in her posterior knee are also improving, with posterior knee symptoms now tension in her calf.    Objective   See Exercise, Manual, and Modality Logs for complete treatment.       Assessment/Plan  Continued IASTM to right distal patellar tendon, near patellar insertion, due to positive response to recent visits.  Introduced static stretching to ankle plantar flexors since she demonstrates no ill response to plantar flexor eccentric's over the past 2 weeks.  Patient advised to hold stretching if she experiences increased Achilles discomfort.    Discussed gym modifications and activities patient wishes to attempt in the coming weeks/months.        Timed:  Manual Therapy:    10     mins  64164;  Therapeutic Exercise:    15     mins  76621;     Neuromuscular Katty:   0    mins  84505;    Therapeutic Activity:     14     mins  59089;     Gait Trainin     mins  73193;     Ultrasound:     0     mins  26722;    Electrical Stimulation:    0     mins  13996 ( );    Untimed:  Electrical Stimulation:    0     mins  51834 (MC );  Mechanical Traction:    0     mins  46096;   Dry Needlin     mins     Timed Treatment:   39   mins   Total Treatment:     39   mins    Marcos Koroma PT  Physical Therapist

## 2024-07-29 ENCOUNTER — TREATMENT (OUTPATIENT)
Dept: PHYSICAL THERAPY | Facility: CLINIC | Age: 44
End: 2024-07-29
Payer: COMMERCIAL

## 2024-07-29 DIAGNOSIS — G89.29 CHRONIC PAIN OF RIGHT KNEE: Primary | ICD-10-CM

## 2024-07-29 DIAGNOSIS — M76.51 PATELLAR TENDINITIS OF RIGHT KNEE: ICD-10-CM

## 2024-07-29 DIAGNOSIS — M25.561 CHRONIC PAIN OF RIGHT KNEE: Primary | ICD-10-CM

## 2024-07-29 PROCEDURE — 97140 MANUAL THERAPY 1/> REGIONS: CPT | Performed by: PHYSICAL THERAPIST

## 2024-07-29 PROCEDURE — 97110 THERAPEUTIC EXERCISES: CPT | Performed by: PHYSICAL THERAPIST

## 2024-07-29 PROCEDURE — 97530 THERAPEUTIC ACTIVITIES: CPT | Performed by: PHYSICAL THERAPIST

## 2024-07-29 NOTE — PROGRESS NOTES
Physical Therapy Daily Progress Note    [facility]  [address]  [place] [address]      Patient: Lizzeth Tejada   : 1980  Diagnosis/ICD-10 Code:  Chronic pain of right knee [M25.561, G89.29]  Referring practitioner: Mando PRICE MD  Date of Initial Visit: Type: THERAPY  Noted: 7/10/2024  Today's Date: 2024  Patient seen for 6 sessions           Subjective   Patient reports increased R knee pain the day of last visit, but she had minimal to no symptoms in the subsequent days and into today.    Objective   See Exercise, Manual, and Modality Logs for complete treatment.       Assessment/Plan  Continued IASTM to R patellar tendon due to positive response. Introduced CKC LE strengthening in standing with good tolerance for HS, but difficulty with quad due to patellofemoral pain. Patient advised to intermittently move her knee during bouts of prolonged sitting, either supported squats or OKC knee flex/ext.            Timed:  Manual Therapy:    15     mins  28178;  Therapeutic Exercise:    11     mins  11206;     Neuromuscular Katty:   0    mins  34881;    Therapeutic Activity:     10     mins  19906;     Gait Trainin     mins  69355;     Ultrasound:     0     mins  84257;    Electrical Stimulation:    0     mins  73461 ( );    Untimed:  Electrical Stimulation:    0     mins  34515 ( );  Mechanical Traction:    0     mins  61035;   Dry Needlin     mins     Timed Treatment:   36   mins   Total Treatment:     36   mins    Marcos Koroma PT  Physical Therapist

## 2024-09-26 ENCOUNTER — TELEPHONE (OUTPATIENT)
Dept: FAMILY MEDICINE CLINIC | Facility: CLINIC | Age: 44
End: 2024-09-26

## 2024-09-26 ENCOUNTER — OFFICE VISIT (OUTPATIENT)
Dept: FAMILY MEDICINE CLINIC | Facility: CLINIC | Age: 44
End: 2024-09-26
Payer: COMMERCIAL

## 2024-09-26 VITALS
SYSTOLIC BLOOD PRESSURE: 110 MMHG | HEART RATE: 93 BPM | OXYGEN SATURATION: 97 % | WEIGHT: 290.5 LBS | BODY MASS INDEX: 39.35 KG/M2 | DIASTOLIC BLOOD PRESSURE: 74 MMHG | HEIGHT: 72 IN

## 2024-09-26 DIAGNOSIS — S03.00XA DISLOCATION OF TEMPOROMANDIBULAR JOINT, INITIAL ENCOUNTER: Primary | ICD-10-CM

## 2024-09-26 DIAGNOSIS — E66.01 MORBID OBESITY: ICD-10-CM

## 2024-09-26 PROCEDURE — 99214 OFFICE O/P EST MOD 30 MIN: CPT | Performed by: FAMILY MEDICINE

## 2024-09-26 RX ORDER — CYCLOSPORINE 0.5 MG/ML
1 EMULSION OPHTHALMIC 2 TIMES DAILY
COMMUNITY

## 2024-09-26 NOTE — PROGRESS NOTES
Date: 2024   Patient Name: Lizzeth Tejada  : 1980   MRN: [id number]     Chief Complaint:    Chief Complaint   Patient presents with    Earache     Patient reports the ear pain, right ear       History of Present Illness    Pt presents with right ear/jaw pain.     Review of Systems:   Review of Systems   HENT:  Positive for congestion, ear pain and rhinorrhea. Negative for hearing loss, sore throat and tinnitus.    Respiratory:  Negative for cough.    Musculoskeletal:  Negative for neck pain.   Skin:  Negative for rash.   Neurological:  Negative for dizziness.   Hematological:  Negative for adenopathy.       Past Medical History:   Past Medical History:   Diagnosis Date    Anxiety     Arthritis 2020    R patella femoral OA    Chronically dry eyes     Headache     Tonsillitis        Past Surgical History:   Past Surgical History:   Procedure Laterality Date    ADENOIDECTOMY       SECTION      CHOLECYSTECTOMY      COLONOSCOPY  2024    FRACTURE SURGERY      TONSILLECTOMY  2006       Family History:   Family History   Problem Relation Age of Onset    No Known Problems Mother     Hypertension Father     Lung cancer Father     Anxiety disorder Father     Cancer Father     Alcohol abuse Brother        Social History:   Social History     Socioeconomic History    Marital status: Unknown   Tobacco Use    Smoking status: Never    Smokeless tobacco: Never   Vaping Use    Vaping status: Never Used   Substance and Sexual Activity    Alcohol use: Yes     Comment: Socially only 1-2 a momth    Drug use: Never    Sexual activity: Not Currently     Partners: Male     Birth control/protection: Abstinence       Medications:     Current Outpatient Medications:     cycloSPORINE (RESTASIS) 0.05 % ophthalmic emulsion, Administer 1 drop to both eyes 2 (Two) Times a Day., Disp: , Rfl:     traZODone (DESYREL) 50 MG tablet, Take 1 tablet by mouth Every Night., Disp: 90 tablet, Rfl: 1    Tirzepatide-Weight  "Management (ZEPBOUND) 2.5 MG/0.5ML solution auto-injector, Inject 0.5 mL under the skin into the appropriate area as directed 1 (One) Time Per Week., Disp: 2 mL, Rfl: 0    Tirzepatide-Weight Management (ZEPBOUND) 5 MG/0.5ML solution auto-injector, Inject 0.5 mL under the skin into the appropriate area as directed 1 (One) Time Per Week., Disp: 2 mL, Rfl: 1    Tirzepatide-Weight Management (ZEPBOUND) 7.5 MG/0.5ML solution auto-injector, Inject 0.5 mL under the skin into the appropriate area as directed 1 (One) Time Per Week., Disp: 2 mL, Rfl: 1    vilazodone (VIIBRYD) 20 MG tablet tablet, 1/2 tab PO daily for 1 week then increase to 1 tab PO daily, Disp: 30 tablet, Rfl: 2    Allergies:   Allergies   Allergen Reactions    Codeine Hives     Difficulty breathing          Physical Exam:  Vital Signs:   Vitals:    09/26/24 0906   BP: 110/74   BP Location: Right arm   Patient Position: Sitting   Cuff Size: Large Adult   Pulse: 93   SpO2: 97%   Weight: 132 kg (290 lb 8 oz)   Height: 182.9 cm (72\")     Body mass index is 39.4 kg/m².     Physical Exam      Assessment/Plan:   Diagnoses and all orders for this visit:    1. Dislocation of temporomandibular joint, initial encounter (Primary)    2. Morbid obesity  -     Tirzepatide-Weight Management (ZEPBOUND) 2.5 MG/0.5ML solution auto-injector; Inject 0.5 mL under the skin into the appropriate area as directed 1 (One) Time Per Week.  Dispense: 2 mL; Refill: 0  -     Tirzepatide-Weight Management (ZEPBOUND) 5 MG/0.5ML solution auto-injector; Inject 0.5 mL under the skin into the appropriate area as directed 1 (One) Time Per Week.  Dispense: 2 mL; Refill: 1  -     Tirzepatide-Weight Management (ZEPBOUND) 7.5 MG/0.5ML solution auto-injector; Inject 0.5 mL under the skin into the appropriate area as directed 1 (One) Time Per Week.  Dispense: 2 mL; Refill: 1    Other orders  -     vilazodone (VIIBRYD) 20 MG tablet tablet; 1/2 tab PO daily for 1 week then increase to 1 tab PO daily  " Dispense: 30 tablet; Refill: 2         Follow Up:   Return in about 3 months (around 12/26/2024) for Weight Check.    Chelly Oliva,   [facility]

## 2024-09-30 ENCOUNTER — TELEPHONE (OUTPATIENT)
Dept: FAMILY MEDICINE CLINIC | Facility: CLINIC | Age: 44
End: 2024-09-30

## 2024-09-30 NOTE — TELEPHONE ENCOUNTER
Caller: Lizzeth Tejada    Relationship: Self    Best call back number: [phone]     What was the call regarding: PATIENT STATES THAT THE PA OR ZEPBOUND WAS DENIED. SHE WOULD LIKE TO KNOW ABOUT OTHER OPTIONS.    Is it okay if the provider responds through MyChart: NO

## 2024-10-02 ENCOUNTER — TELEPHONE (OUTPATIENT)
Dept: FAMILY MEDICINE CLINIC | Facility: CLINIC | Age: 44
End: 2024-10-02
Payer: COMMERCIAL

## 2024-10-02 NOTE — TELEPHONE ENCOUNTER
Caller: Lizzeth Tejada    Relationship: Self    Best call back number: [phone]    What is the best time to reach you: ASAP    Who are you requesting to speak with (clinical staff, provider,  specific staff member): CLINICAL     What was the call regarding:     PATIENT CALLED STATING HER AND DR SILVEIRA DISCUSSED HER TAKING MEDICATION FOR ANXIETY. PER [employer] MESSAGE /27/24 PATIENT STATED SHE WANTED TO TRY VIIBRYD, PLEASE SEND TO HER PHARMACY.        ALSO ZEPBOUND WAS CALLED IN FOR WEIGHT LOSS HOWEVER HER INSURANCE DENIED THE PRIOR AUTHORIZATION. PHARMACY INFORMED HER THAT OTHER TYPES MAY BE COVERED BY HER INSURANCE. PLEASE PROVIDE AN UPDATE      LAST PATIENT CAN NOT GET HER BOTOX UNTIL THE END OF THIS MONTH, SHE WANTED TO KNOW IF DR DIAS WOULD CALL IN A MUSCLE RELAXER UNTIL THEN. PLEASE ADVISE      [facility] [id number] [facility], [place] - [address] - [phone]  - [phone] FX

## 2024-10-07 RX ORDER — VILAZODONE HYDROCHLORIDE 20 MG/1
TABLET ORAL
Qty: 30 TABLET | Refills: 2 | Status: SHIPPED | OUTPATIENT
Start: 2024-10-07

## 2024-10-08 NOTE — TELEPHONE ENCOUNTER
I have looked into this and all weight loss medications are excluded from coverage on her current plan. Unfortunately none of the other weight loss injectable or oral medications will be covered as this is considered a coverage exclusion on her plan. I sent the Viibryd.